# Patient Record
Sex: MALE | Race: OTHER | HISPANIC OR LATINO
[De-identification: names, ages, dates, MRNs, and addresses within clinical notes are randomized per-mention and may not be internally consistent; named-entity substitution may affect disease eponyms.]

---

## 2017-01-27 ENCOUNTER — APPOINTMENT (OUTPATIENT)
Dept: HEART AND VASCULAR | Facility: CLINIC | Age: 47
End: 2017-01-27

## 2017-03-10 ENCOUNTER — APPOINTMENT (OUTPATIENT)
Dept: HEART AND VASCULAR | Facility: CLINIC | Age: 47
End: 2017-03-10

## 2017-03-10 VITALS
SYSTOLIC BLOOD PRESSURE: 115 MMHG | DIASTOLIC BLOOD PRESSURE: 72 MMHG | BODY MASS INDEX: 31.34 KG/M2 | HEART RATE: 80 BPM | WEIGHT: 195 LBS | HEIGHT: 66 IN | OXYGEN SATURATION: 98 %

## 2017-03-13 LAB
ANION GAP SERPL CALC-SCNC: 13 MMOL/L
BUN SERPL-MCNC: 16 MG/DL
CALCIUM SERPL-MCNC: 10 MG/DL
CHLORIDE SERPL-SCNC: 101 MMOL/L
CO2 SERPL-SCNC: 25 MMOL/L
CREAT SERPL-MCNC: 0.97 MG/DL
GLUCOSE SERPL-MCNC: 83 MG/DL
MAGNESIUM SERPL-MCNC: 1.8 MG/DL
NT-PROBNP SERPL-MCNC: 694 PG/ML
POTASSIUM SERPL-SCNC: 4.8 MMOL/L
SODIUM SERPL-SCNC: 139 MMOL/L

## 2017-03-24 ENCOUNTER — APPOINTMENT (OUTPATIENT)
Dept: HEART AND VASCULAR | Facility: CLINIC | Age: 47
End: 2017-03-24

## 2017-03-24 VITALS — HEIGHT: 66 IN | BODY MASS INDEX: 30.53 KG/M2 | WEIGHT: 190 LBS

## 2017-03-24 VITALS — DIASTOLIC BLOOD PRESSURE: 63 MMHG | SYSTOLIC BLOOD PRESSURE: 105 MMHG | HEART RATE: 65 BPM

## 2017-03-24 DIAGNOSIS — Z00.00 ENCOUNTER FOR GENERAL ADULT MEDICAL EXAMINATION W/OUT ABNORMAL FINDINGS: ICD-10-CM

## 2017-04-07 ENCOUNTER — APPOINTMENT (OUTPATIENT)
Dept: HEART AND VASCULAR | Facility: CLINIC | Age: 47
End: 2017-04-07

## 2017-04-07 ENCOUNTER — OTHER (OUTPATIENT)
Age: 47
End: 2017-04-07

## 2017-04-07 VITALS
WEIGHT: 190 LBS | OXYGEN SATURATION: 98 % | HEIGHT: 66 IN | BODY MASS INDEX: 30.53 KG/M2 | DIASTOLIC BLOOD PRESSURE: 66 MMHG | HEART RATE: 72 BPM | SYSTOLIC BLOOD PRESSURE: 115 MMHG

## 2017-04-10 LAB
ANION GAP SERPL CALC-SCNC: 12 MMOL/L
BUN SERPL-MCNC: 17 MG/DL
CALCIUM SERPL-MCNC: 9.8 MG/DL
CHLORIDE SERPL-SCNC: 107 MMOL/L
CO2 SERPL-SCNC: 27 MMOL/L
CREAT SERPL-MCNC: 1.08 MG/DL
GLUCOSE SERPL-MCNC: 88 MG/DL
POTASSIUM SERPL-SCNC: 4.2 MMOL/L
SODIUM SERPL-SCNC: 146 MMOL/L

## 2017-04-19 ENCOUNTER — OTHER (OUTPATIENT)
Age: 47
End: 2017-04-19

## 2017-04-21 LAB
ANION GAP SERPL CALC-SCNC: 18 MMOL/L
BUN SERPL-MCNC: 14 MG/DL
CALCIUM SERPL-MCNC: 9.4 MG/DL
CHLORIDE SERPL-SCNC: 102 MMOL/L
CO2 SERPL-SCNC: 20 MMOL/L
CREAT SERPL-MCNC: 0.93 MG/DL
GLUCOSE SERPL-MCNC: 77 MG/DL
POTASSIUM SERPL-SCNC: 4.1 MMOL/L
SODIUM SERPL-SCNC: 140 MMOL/L

## 2017-04-25 ENCOUNTER — RX RENEWAL (OUTPATIENT)
Age: 47
End: 2017-04-25

## 2017-04-25 ENCOUNTER — OTHER (OUTPATIENT)
Age: 47
End: 2017-04-25

## 2017-05-12 ENCOUNTER — APPOINTMENT (OUTPATIENT)
Dept: HEART AND VASCULAR | Facility: CLINIC | Age: 47
End: 2017-05-12

## 2017-05-12 ENCOUNTER — OTHER (OUTPATIENT)
Age: 47
End: 2017-05-12

## 2017-05-12 VITALS
BODY MASS INDEX: 32.28 KG/M2 | HEART RATE: 68 BPM | SYSTOLIC BLOOD PRESSURE: 120 MMHG | WEIGHT: 200 LBS | DIASTOLIC BLOOD PRESSURE: 68 MMHG

## 2017-05-15 LAB
ANION GAP SERPL CALC-SCNC: 14 MMOL/L
BUN SERPL-MCNC: 15 MG/DL
CALCIUM SERPL-MCNC: 9.6 MG/DL
CHLORIDE SERPL-SCNC: 104 MMOL/L
CO2 SERPL-SCNC: 23 MMOL/L
CREAT SERPL-MCNC: 0.89 MG/DL
GLUCOSE SERPL-MCNC: 95 MG/DL
POTASSIUM SERPL-SCNC: 4.4 MMOL/L
SODIUM SERPL-SCNC: 141 MMOL/L

## 2017-05-17 ENCOUNTER — OTHER (OUTPATIENT)
Age: 47
End: 2017-05-17

## 2017-06-11 ENCOUNTER — FORM ENCOUNTER (OUTPATIENT)
Age: 47
End: 2017-06-11

## 2017-06-12 ENCOUNTER — OUTPATIENT (OUTPATIENT)
Dept: OUTPATIENT SERVICES | Facility: HOSPITAL | Age: 47
LOS: 1 days | End: 2017-06-12
Payer: MEDICAID

## 2017-06-12 ENCOUNTER — APPOINTMENT (OUTPATIENT)
Dept: HEART AND VASCULAR | Facility: CLINIC | Age: 47
End: 2017-06-12

## 2017-06-12 DIAGNOSIS — I50.22 CHRONIC SYSTOLIC (CONGESTIVE) HEART FAILURE: ICD-10-CM

## 2017-06-12 PROCEDURE — 93306 TTE W/DOPPLER COMPLETE: CPT | Mod: 26

## 2017-06-12 PROCEDURE — C8929: CPT

## 2017-06-16 ENCOUNTER — APPOINTMENT (OUTPATIENT)
Dept: HEART AND VASCULAR | Facility: CLINIC | Age: 47
End: 2017-06-16

## 2017-06-16 VITALS
BODY MASS INDEX: 32.14 KG/M2 | HEART RATE: 64 BPM | DIASTOLIC BLOOD PRESSURE: 60 MMHG | SYSTOLIC BLOOD PRESSURE: 103 MMHG | WEIGHT: 200 LBS | HEIGHT: 66 IN

## 2017-06-16 DIAGNOSIS — Z87.898 PERSONAL HISTORY OF OTHER SPECIFIED CONDITIONS: ICD-10-CM

## 2017-06-19 LAB
ANION GAP SERPL CALC-SCNC: 16 MMOL/L
BUN SERPL-MCNC: 16 MG/DL
CALCIUM SERPL-MCNC: 9.9 MG/DL
CHLORIDE SERPL-SCNC: 101 MMOL/L
CO2 SERPL-SCNC: 23 MMOL/L
CREAT SERPL-MCNC: 1.07 MG/DL
GLUCOSE SERPL-MCNC: 101 MG/DL
MAGNESIUM SERPL-MCNC: 1.8 MG/DL
NT-PROBNP SERPL-MCNC: 548 PG/ML
POTASSIUM SERPL-SCNC: 4.2 MMOL/L
SODIUM SERPL-SCNC: 140 MMOL/L

## 2017-06-28 ENCOUNTER — APPOINTMENT (OUTPATIENT)
Dept: HEART AND VASCULAR | Facility: CLINIC | Age: 47
End: 2017-06-28

## 2017-06-28 VITALS — HEART RATE: 84 BPM | DIASTOLIC BLOOD PRESSURE: 72 MMHG | SYSTOLIC BLOOD PRESSURE: 123 MMHG

## 2017-09-08 ENCOUNTER — APPOINTMENT (OUTPATIENT)
Dept: HEART AND VASCULAR | Facility: CLINIC | Age: 47
End: 2017-09-08

## 2018-01-02 ENCOUNTER — RESULT CHARGE (OUTPATIENT)
Age: 48
End: 2018-01-02

## 2018-01-03 ENCOUNTER — APPOINTMENT (OUTPATIENT)
Dept: HEART AND VASCULAR | Facility: CLINIC | Age: 48
End: 2018-01-03
Payer: MEDICAID

## 2018-01-03 VITALS
HEIGHT: 70 IN | SYSTOLIC BLOOD PRESSURE: 92 MMHG | BODY MASS INDEX: 28.06 KG/M2 | HEART RATE: 66 BPM | DIASTOLIC BLOOD PRESSURE: 58 MMHG | TEMPERATURE: 98.3 F | WEIGHT: 195.99 LBS | OXYGEN SATURATION: 98 %

## 2018-01-03 PROCEDURE — 93000 ELECTROCARDIOGRAM COMPLETE: CPT

## 2018-01-03 PROCEDURE — 99214 OFFICE O/P EST MOD 30 MIN: CPT | Mod: 25

## 2018-01-03 PROCEDURE — 36415 COLL VENOUS BLD VENIPUNCTURE: CPT

## 2018-01-03 RX ORDER — SPIRONOLACTONE 25 MG/1
25 TABLET ORAL
Qty: 30 | Refills: 2 | Status: DISCONTINUED | COMMUNITY
Start: 2017-04-25 | End: 2018-01-03

## 2018-01-04 ENCOUNTER — OTHER (OUTPATIENT)
Age: 48
End: 2018-01-04

## 2018-01-04 LAB
ALBUMIN SERPL ELPH-MCNC: 4.2 G/DL
ALP BLD-CCNC: 57 U/L
ALT SERPL-CCNC: 13 U/L
ANION GAP SERPL CALC-SCNC: 13 MMOL/L
AST SERPL-CCNC: 16 U/L
BILIRUB SERPL-MCNC: 0.2 MG/DL
BUN SERPL-MCNC: 17 MG/DL
CALCIUM SERPL-MCNC: 9.7 MG/DL
CHLORIDE SERPL-SCNC: 106 MMOL/L
CO2 SERPL-SCNC: 28 MMOL/L
CREAT SERPL-MCNC: 0.88 MG/DL
GLUCOSE SERPL-MCNC: 73 MG/DL
NT-PROBNP SERPL-MCNC: 433 PG/ML
POTASSIUM SERPL-SCNC: 5.2 MMOL/L
PROT SERPL-MCNC: 7.4 G/DL
SODIUM SERPL-SCNC: 147 MMOL/L

## 2018-12-13 ENCOUNTER — INPATIENT (INPATIENT)
Facility: HOSPITAL | Age: 48
LOS: 2 days | Discharge: HOME | DRG: 293 | End: 2018-12-16
Attending: INTERNAL MEDICINE | Admitting: INTERNAL MEDICINE
Payer: MEDICAID

## 2018-12-13 VITALS
DIASTOLIC BLOOD PRESSURE: 79 MMHG | TEMPERATURE: 98 F | OXYGEN SATURATION: 98 % | SYSTOLIC BLOOD PRESSURE: 108 MMHG | RESPIRATION RATE: 20 BRPM | HEART RATE: 117 BPM

## 2018-12-13 DIAGNOSIS — I50.9 HEART FAILURE, UNSPECIFIED: ICD-10-CM

## 2018-12-13 LAB
ALBUMIN SERPL ELPH-MCNC: 3.3 G/DL — LOW (ref 3.5–5)
ALP SERPL-CCNC: 51 U/L — SIGNIFICANT CHANGE UP (ref 40–120)
ALT FLD-CCNC: 28 U/L DA — SIGNIFICANT CHANGE UP (ref 10–60)
ANION GAP SERPL CALC-SCNC: 8 MMOL/L — SIGNIFICANT CHANGE UP (ref 5–17)
AST SERPL-CCNC: 25 U/L — SIGNIFICANT CHANGE UP (ref 10–40)
BASOPHILS # BLD AUTO: 0.1 K/UL — SIGNIFICANT CHANGE UP (ref 0–0.2)
BASOPHILS NFR BLD AUTO: 1 % — SIGNIFICANT CHANGE UP (ref 0–2)
BILIRUB SERPL-MCNC: 0.6 MG/DL — SIGNIFICANT CHANGE UP (ref 0.2–1.2)
BUN SERPL-MCNC: 12 MG/DL — SIGNIFICANT CHANGE UP (ref 7–18)
CALCIUM SERPL-MCNC: 8.9 MG/DL — SIGNIFICANT CHANGE UP (ref 8.4–10.5)
CHLORIDE SERPL-SCNC: 108 MMOL/L — SIGNIFICANT CHANGE UP (ref 96–108)
CO2 SERPL-SCNC: 26 MMOL/L — SIGNIFICANT CHANGE UP (ref 22–31)
CREAT SERPL-MCNC: 0.92 MG/DL — SIGNIFICANT CHANGE UP (ref 0.5–1.3)
EOSINOPHIL # BLD AUTO: 0.2 K/UL — SIGNIFICANT CHANGE UP (ref 0–0.5)
EOSINOPHIL NFR BLD AUTO: 1.5 % — SIGNIFICANT CHANGE UP (ref 0–6)
GLUCOSE SERPL-MCNC: 95 MG/DL — SIGNIFICANT CHANGE UP (ref 70–99)
HCT VFR BLD CALC: 44.6 % — SIGNIFICANT CHANGE UP (ref 39–50)
HGB BLD-MCNC: 14.2 G/DL — SIGNIFICANT CHANGE UP (ref 13–17)
LYMPHOCYTES # BLD AUTO: 2.7 K/UL — SIGNIFICANT CHANGE UP (ref 1–3.3)
LYMPHOCYTES # BLD AUTO: 27.6 % — SIGNIFICANT CHANGE UP (ref 13–44)
MCHC RBC-ENTMCNC: 31.1 PG — SIGNIFICANT CHANGE UP (ref 27–34)
MCHC RBC-ENTMCNC: 31.8 GM/DL — LOW (ref 32–36)
MCV RBC AUTO: 97.7 FL — SIGNIFICANT CHANGE UP (ref 80–100)
MONOCYTES # BLD AUTO: 0.6 K/UL — SIGNIFICANT CHANGE UP (ref 0–0.9)
MONOCYTES NFR BLD AUTO: 6 % — SIGNIFICANT CHANGE UP (ref 2–14)
NEUTROPHILS # BLD AUTO: 6.4 K/UL — SIGNIFICANT CHANGE UP (ref 1.8–7.4)
NEUTROPHILS NFR BLD AUTO: 63.9 % — SIGNIFICANT CHANGE UP (ref 43–77)
NT-PROBNP SERPL-SCNC: 2505 PG/ML — HIGH (ref 0–125)
PLATELET # BLD AUTO: 294 K/UL — SIGNIFICANT CHANGE UP (ref 150–400)
POTASSIUM SERPL-MCNC: 4.2 MMOL/L — SIGNIFICANT CHANGE UP (ref 3.5–5.3)
POTASSIUM SERPL-SCNC: 4.2 MMOL/L — SIGNIFICANT CHANGE UP (ref 3.5–5.3)
PROT SERPL-MCNC: 7.6 G/DL — SIGNIFICANT CHANGE UP (ref 6–8.3)
RBC # BLD: 4.57 M/UL — SIGNIFICANT CHANGE UP (ref 4.2–5.8)
RBC # FLD: 13.4 % — SIGNIFICANT CHANGE UP (ref 10.3–14.5)
SODIUM SERPL-SCNC: 142 MMOL/L — SIGNIFICANT CHANGE UP (ref 135–145)
TROPONIN I SERPL-MCNC: 0.07 NG/ML — HIGH (ref 0–0.04)
WBC # BLD: 10 K/UL — SIGNIFICANT CHANGE UP (ref 3.8–10.5)
WBC # FLD AUTO: 10 K/UL — SIGNIFICANT CHANGE UP (ref 3.8–10.5)

## 2018-12-13 PROCEDURE — 99285 EMERGENCY DEPT VISIT HI MDM: CPT

## 2018-12-13 PROCEDURE — 71045 X-RAY EXAM CHEST 1 VIEW: CPT | Mod: 26

## 2018-12-13 PROCEDURE — 93010 ELECTROCARDIOGRAM REPORT: CPT

## 2018-12-13 RX ORDER — FUROSEMIDE 40 MG
40 TABLET ORAL ONCE
Qty: 0 | Refills: 0 | Status: COMPLETED | OUTPATIENT
Start: 2018-12-13 | End: 2018-12-13

## 2018-12-13 RX ORDER — ASPIRIN/CALCIUM CARB/MAGNESIUM 324 MG
325 TABLET ORAL ONCE
Qty: 0 | Refills: 0 | Status: COMPLETED | OUTPATIENT
Start: 2018-12-13 | End: 2018-12-13

## 2018-12-13 RX ORDER — ENOXAPARIN SODIUM 100 MG/ML
40 INJECTION SUBCUTANEOUS DAILY
Qty: 0 | Refills: 0 | Status: DISCONTINUED | OUTPATIENT
Start: 2018-12-13 | End: 2018-12-16

## 2018-12-13 RX ORDER — PANTOPRAZOLE SODIUM 20 MG/1
40 TABLET, DELAYED RELEASE ORAL
Qty: 0 | Refills: 0 | Status: DISCONTINUED | OUTPATIENT
Start: 2018-12-13 | End: 2018-12-16

## 2018-12-13 RX ADMIN — Medication 325 MILLIGRAM(S): at 20:36

## 2018-12-13 RX ADMIN — Medication 40 MILLIGRAM(S): at 20:36

## 2018-12-13 NOTE — ED ADULT NURSE NOTE - NSIMPLEMENTINTERV_GEN_ALL_ED
Implemented All Universal Safety Interventions:  [place] to call system. Call bell, personal items and telephone within reach. Instruct patient to call for assistance. Room bathroom lighting operational. Non-slip footwear when patient is off stretcher. Physically safe environment: no spills, clutter or unnecessary equipment. Stretcher in lowest position, wheels locked, appropriate side rails in place.

## 2018-12-13 NOTE — ED PROVIDER NOTE - CARE PLAN
Principal Discharge DX:	Acute on chronic congestive heart failure, unspecified heart failure type  Secondary Diagnosis:	Elevated troponin

## 2018-12-13 NOTE — ED PROVIDER NOTE - PROGRESS NOTE DETAILS
Troponin and bmp came back elevated. CXR still pending. Will treat with asa and Lasix for suspected CHF exacerbation. Will admit. Informed Dr. Kendrick (covering) for admission.  Will inform MAR.

## 2018-12-13 NOTE — ED PROVIDER NOTE - OBJECTIVE STATEMENT
47 y/o M pt with a PMHx of hypercholesterolemia, HTN, renal stone, presents to the ED with complaints of 2 weeks worsening chest pain and shortness of breath associated with cough conductive of pink frothy sputum. Patient reports BLEVINS and orthopnea. Patient states he no longer takes Lasix for along time contrary to chart documentation. Patient notes no drug use or alcohol use. Patient denies leg swelling, fever, chills ot any other complaints. NKDA.

## 2018-12-14 DIAGNOSIS — I50.9 HEART FAILURE, UNSPECIFIED: ICD-10-CM

## 2018-12-14 DIAGNOSIS — E78.00 PURE HYPERCHOLESTEROLEMIA, UNSPECIFIED: ICD-10-CM

## 2018-12-14 DIAGNOSIS — Z29.9 ENCOUNTER FOR PROPHYLACTIC MEASURES, UNSPECIFIED: ICD-10-CM

## 2018-12-14 DIAGNOSIS — I10 ESSENTIAL (PRIMARY) HYPERTENSION: ICD-10-CM

## 2018-12-14 DIAGNOSIS — I24.9 ACUTE ISCHEMIC HEART DISEASE, UNSPECIFIED: ICD-10-CM

## 2018-12-14 LAB
ANION GAP SERPL CALC-SCNC: 6 MMOL/L — SIGNIFICANT CHANGE UP (ref 5–17)
BUN SERPL-MCNC: 12 MG/DL — SIGNIFICANT CHANGE UP (ref 7–18)
CALCIUM SERPL-MCNC: 8.8 MG/DL — SIGNIFICANT CHANGE UP (ref 8.4–10.5)
CHLORIDE SERPL-SCNC: 109 MMOL/L — HIGH (ref 96–108)
CHOLEST SERPL-MCNC: 146 MG/DL — SIGNIFICANT CHANGE UP (ref 10–199)
CK MB BLD-MCNC: 1.1 % — SIGNIFICANT CHANGE UP (ref 0–3.5)
CK MB BLD-MCNC: 1.2 % — SIGNIFICANT CHANGE UP (ref 0–3.5)
CK MB CFR SERPL CALC: 1.9 NG/ML — SIGNIFICANT CHANGE UP (ref 0–3.6)
CK MB CFR SERPL CALC: 2.3 NG/ML — SIGNIFICANT CHANGE UP (ref 0–3.6)
CK SERPL-CCNC: 156 U/L — SIGNIFICANT CHANGE UP (ref 35–232)
CK SERPL-CCNC: 202 U/L — SIGNIFICANT CHANGE UP (ref 35–232)
CO2 SERPL-SCNC: 26 MMOL/L — SIGNIFICANT CHANGE UP (ref 22–31)
CREAT SERPL-MCNC: 0.88 MG/DL — SIGNIFICANT CHANGE UP (ref 0.5–1.3)
GLUCOSE BLDC GLUCOMTR-MCNC: 120 MG/DL — HIGH (ref 70–99)
GLUCOSE SERPL-MCNC: 107 MG/DL — HIGH (ref 70–99)
HBA1C BLD-MCNC: 5.6 % — SIGNIFICANT CHANGE UP (ref 4–5.6)
HCT VFR BLD CALC: 42.7 % — SIGNIFICANT CHANGE UP (ref 39–50)
HDLC SERPL-MCNC: 65 MG/DL — SIGNIFICANT CHANGE UP
HGB BLD-MCNC: 13.6 G/DL — SIGNIFICANT CHANGE UP (ref 13–17)
LIPID PNL WITH DIRECT LDL SERPL: 58 MG/DL — SIGNIFICANT CHANGE UP
MAGNESIUM SERPL-MCNC: 2.3 MG/DL — SIGNIFICANT CHANGE UP (ref 1.6–2.6)
MCHC RBC-ENTMCNC: 31.2 PG — SIGNIFICANT CHANGE UP (ref 27–34)
MCHC RBC-ENTMCNC: 31.9 GM/DL — LOW (ref 32–36)
MCV RBC AUTO: 97.6 FL — SIGNIFICANT CHANGE UP (ref 80–100)
PHOSPHATE SERPL-MCNC: 3.7 MG/DL — SIGNIFICANT CHANGE UP (ref 2.5–4.5)
PLATELET # BLD AUTO: 291 K/UL — SIGNIFICANT CHANGE UP (ref 150–400)
POTASSIUM SERPL-MCNC: 3.8 MMOL/L — SIGNIFICANT CHANGE UP (ref 3.5–5.3)
POTASSIUM SERPL-SCNC: 3.8 MMOL/L — SIGNIFICANT CHANGE UP (ref 3.5–5.3)
RBC # BLD: 4.37 M/UL — SIGNIFICANT CHANGE UP (ref 4.2–5.8)
RBC # FLD: 13.7 % — SIGNIFICANT CHANGE UP (ref 10.3–14.5)
SODIUM SERPL-SCNC: 141 MMOL/L — SIGNIFICANT CHANGE UP (ref 135–145)
TOTAL CHOLESTEROL/HDL RATIO MEASUREMENT: 2.2 RATIO — LOW (ref 3.4–9.6)
TRIGL SERPL-MCNC: 116 MG/DL — SIGNIFICANT CHANGE UP (ref 10–149)
TROPONIN I SERPL-MCNC: 0.05 NG/ML — HIGH (ref 0–0.04)
TROPONIN I SERPL-MCNC: 0.06 NG/ML — HIGH (ref 0–0.04)
TSH SERPL-MCNC: 2.15 UU/ML — SIGNIFICANT CHANGE UP (ref 0.34–4.82)
VIT B12 SERPL-MCNC: 338 PG/ML — SIGNIFICANT CHANGE UP (ref 232–1245)
WBC # BLD: 9.4 K/UL — SIGNIFICANT CHANGE UP (ref 3.8–10.5)
WBC # FLD AUTO: 9.4 K/UL — SIGNIFICANT CHANGE UP (ref 3.8–10.5)

## 2018-12-14 PROCEDURE — 93306 TTE W/DOPPLER COMPLETE: CPT | Mod: 26

## 2018-12-14 RX ORDER — SPIRONOLACTONE 25 MG/1
25 TABLET, FILM COATED ORAL DAILY
Qty: 0 | Refills: 0 | Status: DISCONTINUED | OUTPATIENT
Start: 2018-12-14 | End: 2018-12-16

## 2018-12-14 RX ORDER — SACUBITRIL AND VALSARTAN 24; 26 MG/1; MG/1
1 TABLET, FILM COATED ORAL
Qty: 0 | Refills: 0 | Status: DISCONTINUED | OUTPATIENT
Start: 2018-12-14 | End: 2018-12-16

## 2018-12-14 RX ORDER — FUROSEMIDE 40 MG
20 TABLET ORAL ONCE
Qty: 0 | Refills: 0 | Status: COMPLETED | OUTPATIENT
Start: 2018-12-14 | End: 2018-12-14

## 2018-12-14 RX ORDER — FUROSEMIDE 40 MG
40 TABLET ORAL DAILY
Qty: 0 | Refills: 0 | Status: DISCONTINUED | OUTPATIENT
Start: 2018-12-14 | End: 2018-12-14

## 2018-12-14 RX ORDER — ASPIRIN/CALCIUM CARB/MAGNESIUM 324 MG
81 TABLET ORAL DAILY
Qty: 0 | Refills: 0 | Status: DISCONTINUED | OUTPATIENT
Start: 2018-12-14 | End: 2018-12-14

## 2018-12-14 RX ORDER — DIGOXIN 250 MCG
0.12 TABLET ORAL DAILY
Qty: 0 | Refills: 0 | Status: DISCONTINUED | OUTPATIENT
Start: 2018-12-14 | End: 2018-12-16

## 2018-12-14 RX ORDER — LISINOPRIL 2.5 MG/1
2.5 TABLET ORAL DAILY
Qty: 0 | Refills: 0 | Status: DISCONTINUED | OUTPATIENT
Start: 2018-12-14 | End: 2018-12-14

## 2018-12-14 RX ORDER — ASPIRIN/CALCIUM CARB/MAGNESIUM 324 MG
81 TABLET ORAL DAILY
Qty: 0 | Refills: 0 | Status: DISCONTINUED | OUTPATIENT
Start: 2018-12-14 | End: 2018-12-16

## 2018-12-14 RX ORDER — INFLUENZA VIRUS VACCINE 15; 15; 15; 15 UG/.5ML; UG/.5ML; UG/.5ML; UG/.5ML
0.5 SUSPENSION INTRAMUSCULAR ONCE
Qty: 0 | Refills: 0 | Status: DISCONTINUED | OUTPATIENT
Start: 2018-12-14 | End: 2018-12-16

## 2018-12-14 RX ORDER — FUROSEMIDE 40 MG
20 TABLET ORAL DAILY
Qty: 0 | Refills: 0 | Status: DISCONTINUED | OUTPATIENT
Start: 2018-12-14 | End: 2018-12-16

## 2018-12-14 RX ORDER — CARVEDILOL PHOSPHATE 80 MG/1
3.12 CAPSULE, EXTENDED RELEASE ORAL EVERY 12 HOURS
Qty: 0 | Refills: 0 | Status: DISCONTINUED | OUTPATIENT
Start: 2018-12-14 | End: 2018-12-14

## 2018-12-14 RX ORDER — ATORVASTATIN CALCIUM 80 MG/1
80 TABLET, FILM COATED ORAL AT BEDTIME
Qty: 0 | Refills: 0 | Status: DISCONTINUED | OUTPATIENT
Start: 2018-12-14 | End: 2018-12-16

## 2018-12-14 RX ORDER — CARVEDILOL PHOSPHATE 80 MG/1
3.12 CAPSULE, EXTENDED RELEASE ORAL EVERY 12 HOURS
Qty: 0 | Refills: 0 | Status: DISCONTINUED | OUTPATIENT
Start: 2018-12-14 | End: 2018-12-16

## 2018-12-14 RX ADMIN — Medication 20 MILLIGRAM(S): at 12:30

## 2018-12-14 RX ADMIN — Medication 81 MILLIGRAM(S): at 13:14

## 2018-12-14 RX ADMIN — CARVEDILOL PHOSPHATE 3.12 MILLIGRAM(S): 80 CAPSULE, EXTENDED RELEASE ORAL at 02:09

## 2018-12-14 RX ADMIN — ATORVASTATIN CALCIUM 80 MILLIGRAM(S): 80 TABLET, FILM COATED ORAL at 22:55

## 2018-12-14 NOTE — H&P ADULT - NSHPPHYSICALEXAM_GEN_ALL_CORE
Vital Signs Last 24 Hrs  T(C): 36.8 (14 Dec 2018 01:02), Max: 36.8 (14 Dec 2018 01:02)  T(F): 98.2 (14 Dec 2018 01:02), Max: 98.2 (14 Dec 2018 01:02)  HR: 105 (14 Dec 2018 01:02) (105 - 117)  BP: 110/75 (14 Dec 2018 01:02) (108/79 - 110/75)  RR: 18 (14 Dec 2018 01:02) (18 - 20)  SpO2: 97% (14 Dec 2018 01:02) (97% - 98%)

## 2018-12-14 NOTE — CONSULT NOTE ADULT - ASSESSMENT
48 yr old Male with PMHX of Non-ischemic CM w/ Severe global LVSD, Grade IV DD EF 15%, HTN, HLD presents with CP and SOB x 2 weeks,acute on chronic systolic HF due to non-compliance.  1.Tele monitoring.  2.Echocardiogram.  3.Change IV lasix 20mg qd, add aldactone 25mg qd.  4..D/C ACE,resume entresto.  5.CHF-agree with coreg, add dig .125mg qd.  6.GI and DVT prophylaxis.  7.D/W pt need for compliance with BP medication.

## 2018-12-14 NOTE — H&P ADULT - PROBLEM SELECTOR PLAN 3
-c/w current antihypertensive regimen  -Monitor BP  -Dash Diet -Hold antihypertensives with concern for hypotension- c/w lasix and coreg   -Monitor BP  -Dash Diet

## 2018-12-14 NOTE — H&P ADULT - RS GEN PE MLT RESP DETAILS PC
no rales/good air movement/airway patent/no rhonchi/no wheezes/breath sounds equal/respirations non-labored

## 2018-12-14 NOTE — H&P ADULT - PMH
CHF (congestive heart failure), NYHA class IV, chronic, combined    HTN (hypertension)    Hypercholesterolemia    Renal stones

## 2018-12-14 NOTE — H&P ADULT - HISTORY OF PRESENT ILLNESS
Pt is a 48M, Hx of Systolic CHF w/ Severe global LVSD, Grade IV DD EF 15%, HTN, HLD presents with CP and SOB x 2 weeks.  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  Pt states the pain is intermittent mainly during work and relieved with rest.  He states the cough has been getting progressively worse waqas at night when he lies flat.  Pt states he is not compliant with his medications, and chooses only to take entresto, and atorvastatin.  He sees his cardiologist, Dr. Louis, monthly.  He saw him last month, but did not have any new issues at that time.  Pt states his last echo was done over a year ago showing mild improvent of his EF.  Pt was recommended an AICD in the past, but refused bec he is "too young to have an implant."  Pt denies dizziness, syncope, HA, weakness, recent illness, recent travel, dizziness, N/V/D/C, abd pain, fever or chills.     In the ED, pt presents in no acute distress, vitals sig for  remaining WNL, and EKG sinus tach

## 2018-12-14 NOTE — H&P ADULT - PROBLEM SELECTOR PLAN 2
-Decompensated HF, likely from medication and dietary non-compliance  -History of Systolic CHF w/ EF of 15-20%, GIV DD w/ persistent tachycardia  -Pt non-compliant with diuretic and b-blocker   -will resume low dose Coreg, and lasix 40IV daily for now  -will continue switch Entresto to Losartan   -Tele monitoring   -Hold IV fluids and monitor daily weight/ strict I/O's  -Low Sodium Diet w/ fluid restriction   -f/u repeat Echo  -Cardio: Dr. Palafox

## 2018-12-14 NOTE — CONSULT NOTE ADULT - SUBJECTIVE AND OBJECTIVE BOX
CHIEF COMPLAINT:Patient is a 48y old  Male who presents with a chief complaint of CP .      HPI:  Pt is a 48 yr old Male with PMHX of Non-ischemic CM w/ Severe global LVSD, Grade IV DD EF 15%, HTN, HLD presents with CP and SOB x 2 weeks.  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  Pt states the pain is intermittent mainly during work and relieved with rest.  He states the cough has been getting progressively worse waqas at night when he lies flat.  Pt states he is not compliant with his medications, and chooses only to take entresto, and atorvastatin.  He sees his cardiologist, Dr. Louis, monthly.  He saw him last month, but did not have any new issues at that time.  Pt states his last echo was done over a year ago showing mild improvent of his EF.  Pt was recommended an AICD in the past, but refused bec he is "too young to have an implant."  Pt denies dizziness, syncope, HA, weakness, recent illness, recent travel, dizziness, N/V/D/C, abd pain, fever or chills.     In the ED, pt presents in no acute distress, vitals sig for  remaining WNL, and EKG sinus tach (14 Dec 2018 01:19)      PAST MEDICAL & SURGICAL HISTORY:  CHF (congestive heart failure), NYHA class IV, chronic, combined  Hypercholesterolemia  HTN (hypertension)  Renal stones        MEDICATIONS  (STANDING):  aspirin  chewable 81 milliGRAM(s) Oral daily  atorvastatin 80 milliGRAM(s) Oral at bedtime  carvedilol 3.125 milliGRAM(s) Oral every 12 hours  enoxaparin Injectable 40 milliGRAM(s) SubCutaneous daily  furosemide   Injectable 40 milliGRAM(s) IV Push daily  influenza   Vaccine 0.5 milliLiter(s) IntraMuscular once  lisinopril 2.5 milliGRAM(s) Oral daily  pantoprazole    Tablet 40 milliGRAM(s) Oral before breakfast    MEDICATIONS  (PRN):      FAMILY HISTORY:  Family history of cerebrovascular accident (CVA) (Father)      SOCIAL HISTORY:    [ x] Non-smoker    [x ] Alcohol-denies    Allergies    No Known Allergies    Intolerances    	    REVIEW OF SYSTEMS:  CONSTITUTIONAL: No fever, weight loss, or fatigue  EYES: No eye pain, visual disturbances, or discharge  ENT:  No difficulty hearing, tinnitus, vertigo; No sinus or throat pain  NECK: No pain or stiffness  RESPIRATORY: + cough, No wheezing, chills or hemoptysis; + Shortness of Breath  CARDIOVASCULAR: + chest pain, No palpitations, passing out, dizziness, or leg swelling  GASTROINTESTINAL: No abdominal or epigastric pain. No nausea, vomiting, or hematemesis; No diarrhea or constipation. No melena or hematochezia.  GENITOURINARY: No dysuria, frequency, hematuria, or incontinence  NEUROLOGICAL: No headaches, memory loss, loss of strength, numbness, or tremors  SKIN: No itching, burning, rashes, or lesions   LYMPH Nodes: No enlarged glands  ENDOCRINE: No heat or cold intolerance; No hair loss  MUSCULOSKELETAL: No joint pain or swelling; No muscle, back, or extremity pain  PSYCHIATRIC: No depression, anxiety, mood swings, or difficulty sleeping  HEME/LYMPH: No easy bruising, or bleeding gums  ALLERGY AND IMMUNOLOGIC: No hives or eczema	      PHYSICAL EXAM:  T(C): 36.8 (18 @ 11:02), Max: 36.9 (18 @ 02:50)  HR: 108 (18 @ 11:02) (100 - 117)  BP: 106/65 (18 @ 11:02) (98/66 - 110/75)  RR: 18 (18 @ 11:02) (18 - 20)  SpO2: 100% (18 @ 11:02) (97% - 100%)      Appearance: Normal	  HEENT:   Normal oral mucosa, PERRL, EOMI	  Lymphatic: No lymphadenopathy  Cardiovascular: Normal S1 S2, + JVD, No murmurs, No edema  Respiratory: Dec BS at bases  Psychiatry: A & O x 3, Mood & affect appropriate  Gastrointestinal:  Soft, Non-tender, + BS	  Skin: No rashes, No ecchymoses, No cyanosis	  Neurologic: Non-focal  Extremities: Normal range of motion, No clubbing, cyanosis or edema  Vascular: Peripheral pulses palpable 2+ bilaterally        ECG:  	sinus tach, lbbb    LABS:	 	    CARDIAC MARKERS:  CARDIAC MARKERS ( 14 Dec 2018 10:14 )  0.046 ng/mL / x     / 156 U/L / x     / 1.9 ng/mL  CARDIAC MARKERS ( 14 Dec 2018 00:11 )  0.056 ng/mL / x     / 202 U/L / x     / 2.3 ng/mL  CARDIAC MARKERS ( 13 Dec 2018 18:48 )  0.068 ng/mL / x     / x     / x     / x                                  13.6   9.4   )-----------( 291      ( 14 Dec 2018 05:57 )             42.7         141  |  109<H>  |  12  ----------------------------<  107<H>  3.8   |  26  |  0.88    Ca    8.8      14 Dec 2018 05:57  Phos  3.7       Mg     2.3         TPro  7.6  /  Alb  3.3<L>  /  TBili  0.6  /  DBili  x   /  AST  25  /  ALT  28  /  AlkPhos  51      proBNP: Serum Pro-Brain Natriuretic Peptide: 2505 pg/mL ( @ 18:48)    Lipid Profile: Cholesterol 146  LDL 58  HDL 65      HgA1c: Hemoglobin A1C, Whole Blood: 5.6 % ( @ 09:10)    TSH: Thyroid Stimulating Hormone, Serum: 2.15 uU/mL ( @ 05:57)        LM:   --  LM: Normal.  LAD:   --  LAD: Normal.  CX:   --  Circumflex: Normal.  RCA:   --  RCA: Normal.  COMPLICATIONS: There were no complications.  DIAGNOSTIC RECOMMENDATIONS: The patient should continue with the present  medications. The patient's diuretic regimen should be carefully increased.  Prepared and signed by  Georgi Croft M.D.  Signed 2016 17:52:23      EXAM:  ECHOCARDIOGRAM W CONTRAST                          PROCEDURE DATE:  2017                        INTERPRETATION:  Patient Height: 177.8 cm  Patient Weight: 90.7 kg  Systolic Pressure: 102 mmHg  Diastolic Pressure: 49 mmHg  BSA: 2.1 m^2  Interpretation Summary  The left ventricle is moderately dilated. There is severe global   hypokinesis of   the left ventricle. The left ventricular ejection fraction is severely   reduced. The left ventricular ejection fraction is estimated to be   15-20%  The   right ventricle is normal in size and function.The left atrium is mildly   dilated.The left atrial volume index is 35 cc/m2 (normal <34cc/m2)Right   atrial   size is normal.Structurally normal aortic valve.No aortic regurgitation   noted.Tethered mitral valve leaflets with normal opening.There is mild to   moderate mitral regurgitation.Structurally normal tricuspid valve.There   is   trace tricuspid regurgitation.The pulmonary artery systolic pressure is   estimated to be 20 mmHg.Structurally normal pulmonic valve.No pulmonic   regurgitation noted.There is no pericardial effusion.  Procedure Details  A complete two-dimensional transthoracic echocardiogram was performed (2D,  M-mode, spectral and color flow doppler).  Study Quality: Fair.  After verbal consent obtained, contrast injection of 2ml of diluted   Definity  contrast (1.3ml Definity diluted in 8.7ml Saline) were given to enhance  endocardial resolution.  Contrast injection with Definity was performed.  Left Ventricle  The left ventricle is moderately dilated.  There is severe global hypokinesis of the left ventricle.  The left ventricular ejection fraction is severely reduced.  The left ventricular ejection fraction is estimated to be 15-20%  Left Atrium  The left atrium is mildly dilated.  The left atrial volume index is 35 cc/m2 (normal <34cc/m2)  Right Atrium  Right atrial size is normal.  Right Ventricle  The right ventricle is normal in size and function.  Aortic Valve  Structurally normal aortic valve.  No aortic regurgitation noted.  Mitral Valve  Tethered mitral valve leaflets with normal opening.  There is mild to moderate mitral regurgitation.  Tricuspid Valve  Structurally normal tricuspid valve.  There is trace tricuspid regurgitation.  The pulmonary artery systolic pressure is estimated to be 20 mmHg.  Pulmonic Valve  Structurally normal pulmonic valve.  No pulmonic regurgitation noted.  Arteries and Venous System  No aortic root dilatation.  Normal size aortic arch with no hemodynamic evidence for coarctation  The inferior vena cava is normal in size (<2.1 cm) with normal inspiratory  collapse (>50%) consistent with normal right atrial pressure.  Pericardium / Pleura  There is no pericardial effusion.  Doppler Measurements & Calculations  MV E point: 79.0 cm/sec  MV A point: 40.0 cm/sec  MV E/A: 2.0  MV dec time: 0.2 sec  Ao V2 max: 109.9 cm/sec  Ao max P.8 mmHg  Ao max PG (full): 3.3 mmHg  LV max P.6 mmHg  LV V1 max: 62.7 cm/sec  MR max gonzalo: 468.7 cm/sec  MR max P.9mmHg  MR mean gonzalo: 336.3 cm/sec  MR mean P.5 mmHg  MR VTI: 176.4 cm  MR PISA: 1.9 cm^2  MR flow rate: 72.2 cm^3/sec  MR ERO: 0.2 cm^2  MR volume: 27.2 ml  MR PISA radius: 0.5 cm  MR alias gonzalo: 38.5 cm/sec  TR Max gonzalo: 215.8 cm/sec  MMode 2D Measurements & Calculations  IVSd: 0.9 cm  LVIDd: 6.4 cm  LVIDs: 5.5 cm  LVPWd: 1.0 cm  IVS/LVPW: 0.9  FS: 14.0 %  EDV(Teich): 211.5 ml  ESV(Teich): 150.0 ml  LV mass(C)d: 258.2 grams  LV mass(C)dI: 123.7 grams/m^2  SI(cubed): 46.5 ml/m^2  Ao root diam: 2.9 cm  Ao root area: 6.8 cm^2  LA dimension: 3.9 cm  LA/Ao: 1.3  LVLd ap4: 8.9 cm  EDV(MOD-sp4): 217 ml  LVLs ap4: 8.5 cm  ESV(MOD-sp4): 156 ml  EF(MOD-sp4): 28.1 %  SV(MOD-sp4): 61 ml  SI(MOD-sp4): 29.2 ml/m^2  Interpreting Physician:Krystin Burris MD electronically signed on 2017   17:31:30

## 2018-12-14 NOTE — H&P ADULT - ASSESSMENT
Pt is a 48M, Hx of Systolic CHF w/ Severe global LVSD, Grade IV DD EF 15%, HTN, HLD presents with CP and SOB x 2 weeks.  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  In the ED, pt presents in no acute distress, vitals sig for  remaining WNL, and EKG sinus tach.  Pt remains afebrile w/o leukocytosis.  Remaining labs concerning for trop 0.063 w/ proBNP of 2505.  Symptoms are likely 2/2 decompensated CHF exacerbation given pts medication non-compliance, but ACS should be ruled out.      Pt will be admitted to tele for SCA to r/o NSTEMI, and CHF exacerbation Pt is a 48M, Hx of Systolic CHF w/ Severe global LVSD, Grade IV DD EF 15%, HTN, HLD presents with CP and SOB x 2 weeks.  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  In the ED, pt presents in no acute distress, vitals sig for  remaining WNL, and EKG sinus tach.  Pt remains afebrile w/o leukocytosis.  Remaining labs concerning for trop 0.063 w/ proBNP of 2505.  Symptoms are likely 2/2 decompensated CHF exacerbation given pts medication non-compliance, but ACS should be ruled out.      Pt will be admitted to tele for ACS to r/o NSTEMI, and CHF exacerbation

## 2018-12-15 ENCOUNTER — TRANSCRIPTION ENCOUNTER (OUTPATIENT)
Age: 48
End: 2018-12-15

## 2018-12-15 LAB
ANION GAP SERPL CALC-SCNC: 7 MMOL/L — SIGNIFICANT CHANGE UP (ref 5–17)
BUN SERPL-MCNC: 17 MG/DL — SIGNIFICANT CHANGE UP (ref 7–18)
CALCIUM SERPL-MCNC: 8.7 MG/DL — SIGNIFICANT CHANGE UP (ref 8.4–10.5)
CHLORIDE SERPL-SCNC: 105 MMOL/L — SIGNIFICANT CHANGE UP (ref 96–108)
CO2 SERPL-SCNC: 25 MMOL/L — SIGNIFICANT CHANGE UP (ref 22–31)
CREAT SERPL-MCNC: 1 MG/DL — SIGNIFICANT CHANGE UP (ref 0.5–1.3)
GLUCOSE SERPL-MCNC: 117 MG/DL — HIGH (ref 70–99)
HCT VFR BLD CALC: 44.9 % — SIGNIFICANT CHANGE UP (ref 39–50)
HGB BLD-MCNC: 14.3 G/DL — SIGNIFICANT CHANGE UP (ref 13–17)
MCHC RBC-ENTMCNC: 30.9 PG — SIGNIFICANT CHANGE UP (ref 27–34)
MCHC RBC-ENTMCNC: 31.7 GM/DL — LOW (ref 32–36)
MCV RBC AUTO: 97.4 FL — SIGNIFICANT CHANGE UP (ref 80–100)
PLATELET # BLD AUTO: 294 K/UL — SIGNIFICANT CHANGE UP (ref 150–400)
POTASSIUM SERPL-MCNC: 4.1 MMOL/L — SIGNIFICANT CHANGE UP (ref 3.5–5.3)
POTASSIUM SERPL-SCNC: 4.1 MMOL/L — SIGNIFICANT CHANGE UP (ref 3.5–5.3)
RBC # BLD: 4.62 M/UL — SIGNIFICANT CHANGE UP (ref 4.2–5.8)
RBC # FLD: 13.2 % — SIGNIFICANT CHANGE UP (ref 10.3–14.5)
SODIUM SERPL-SCNC: 137 MMOL/L — SIGNIFICANT CHANGE UP (ref 135–145)
WBC # BLD: 11.8 K/UL — HIGH (ref 3.8–10.5)
WBC # FLD AUTO: 11.8 K/UL — HIGH (ref 3.8–10.5)

## 2018-12-15 RX ORDER — SACUBITRIL AND VALSARTAN 24; 26 MG/1; MG/1
1 TABLET, FILM COATED ORAL
Qty: 60 | Refills: 0 | OUTPATIENT
Start: 2018-12-15 | End: 2019-01-13

## 2018-12-15 RX ORDER — CARVEDILOL PHOSPHATE 80 MG/1
1 CAPSULE, EXTENDED RELEASE ORAL
Qty: 60 | Refills: 0 | OUTPATIENT
Start: 2018-12-15 | End: 2019-01-13

## 2018-12-15 RX ORDER — SPIRONOLACTONE 25 MG/1
1 TABLET, FILM COATED ORAL
Qty: 0 | Refills: 0 | COMMUNITY
Start: 2018-12-15

## 2018-12-15 RX ORDER — DIGOXIN 250 MCG
1 TABLET ORAL
Qty: 30 | Refills: 0 | OUTPATIENT
Start: 2018-12-15 | End: 2019-01-13

## 2018-12-15 RX ADMIN — Medication 81 MILLIGRAM(S): at 13:26

## 2018-12-15 RX ADMIN — SPIRONOLACTONE 25 MILLIGRAM(S): 25 TABLET, FILM COATED ORAL at 05:32

## 2018-12-15 RX ADMIN — ENOXAPARIN SODIUM 40 MILLIGRAM(S): 100 INJECTION SUBCUTANEOUS at 13:26

## 2018-12-15 RX ADMIN — ATORVASTATIN CALCIUM 80 MILLIGRAM(S): 80 TABLET, FILM COATED ORAL at 22:35

## 2018-12-15 RX ADMIN — PANTOPRAZOLE SODIUM 40 MILLIGRAM(S): 20 TABLET, DELAYED RELEASE ORAL at 05:33

## 2018-12-15 RX ADMIN — SACUBITRIL AND VALSARTAN 1 TABLET(S): 24; 26 TABLET, FILM COATED ORAL at 05:30

## 2018-12-15 RX ADMIN — Medication 0.12 MILLIGRAM(S): at 05:30

## 2018-12-15 RX ADMIN — CARVEDILOL PHOSPHATE 3.12 MILLIGRAM(S): 80 CAPSULE, EXTENDED RELEASE ORAL at 05:32

## 2018-12-15 RX ADMIN — Medication 20 MILLIGRAM(S): at 05:30

## 2018-12-15 NOTE — DISCHARGE NOTE ADULT - HOSPITAL COURSE
Pt is a 48M, Hx of Systolic CHF w/ Severe global LVSD, Grade IV DD EF 15%, HTN, HLD presents with CP and SOB x 2 weeks.  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  Pt states the pain is intermittent mainly during work and relieved with rest.  He states the cough has been getting progressively worse waqas at night when he lies flat.  Pt states he is not compliant with his medications, and chooses only to take entresto, and atorvastatin.  He sees his cardiologist, Dr. Louis, monthly.  He saw him last month, but did not have any new issues at that time.  Pt states his last echo was done over a year ago showing mild improvent of his EF.  Pt was recommended an AICD in the past, but refused bec he is "too young to have an implant."  Pt denies dizziness, syncope, HA, weakness, recent illness, recent travel, dizziness, N/V/D/C, abd pain, fever or chills. In the ED, pt presents in no acute distress, vitals sig for  remaining WNL, and EKG sinus tach . Given patient's improved clinical status and current hemodynamic stability, decision was made to discharge the patient. Patient is stable for discharge per attending and is advised to follow up with PCP as outpatient . Please refer to patient's complete medical chart with documents for a full hospital course, for this is only a brief summary.

## 2018-12-15 NOTE — DISCHARGE NOTE ADULT - PROVIDER TOKENS
FREE:[LAST:[Rolando],FIRST:[Mishel],PHONE:[phone]],FAX:[(   [phone]    -]],TOKEN:'[id number]'

## 2018-12-15 NOTE — DISCHARGE NOTE ADULT - PLAN OF CARE
Blood pressure <130/80 mm Hg Your medications were adjusted in the hospital. You are on medications to manage both CHF and HTN. Continue with medications as prescribed. Your lipid profile is stable. Continue with Atorvastatin home dose Resolution of symptoms, improved quality of life You were admitted for evaluation of your chest pain and difficulty breathing. Your heart enzymes were trended, which did not show possibility of an ischemia. You were seen by cardiologist Dr Palafox who adjusted your medications for management of both congestive heart failure and hypertension. An ultrasound of your heart was done which shows Ejection fraction (pumping action) of 10%). Continue with medications as prescribed. You were counseled regarding the need to be compliant with your medications to prevent worsening of your condition.

## 2018-12-15 NOTE — DISCHARGE NOTE ADULT - CARE PLAN
Principal Discharge DX:	Acute on chronic congestive heart failure, unspecified heart failure type  Goal:	Resolution of symptoms, improved quality of life  Assessment and plan of treatment:	You were admitted for evaluation of your chest pain and difficulty breathing. Your heart enzymes were trended, which did not show possibility of an ischemia. You were seen by cardiologist Dr Palafox who adjusted your medications for management of both congestive heart failure and hypertension. An ultrasound of your heart was done which shows Ejection fraction (pumping action) of 10%). Continue with medications as prescribed. You were counseled regarding the need to be compliant with your medications to prevent worsening of your condition.  Secondary Diagnosis:	Essential hypertension  Goal:	Blood pressure <130/80 mm Hg  Assessment and plan of treatment:	Your medications were adjusted in the hospital. You are on medications to manage both CHF and HTN. Continue with medications as prescribed.  Secondary Diagnosis:	Hypercholesterolemia  Assessment and plan of treatment:	Your lipid profile is stable. Continue with Atorvastatin home dose

## 2018-12-15 NOTE — DISCHARGE NOTE ADULT - CARE PROVIDER_API CALL
Mishel Marshall  Phone: [phone]  Fax: [phone]   )    -    Aminah Palafox), Internal Medicine  [address] [place] [id number]  Phone: [phone]  Fax: [phone]

## 2018-12-15 NOTE — DISCHARGE NOTE ADULT - MEDICATION SUMMARY - MEDICATIONS TO TAKE
I will START or STAY ON the medications listed below when I get home from the hospital:    Aldactone 25 mg oral tablet  -- 1 tab(s) by mouth once a day  -- Indication: For CHF (congestive heart failure), NYHA class IV, chronic, combined    aspirin 81 mg oral tablet, chewable  -- 1 tab(s) by mouth once a day    hospital  -- Indication: For CHF (congestive heart failure), NYHA class IV, chronic, combined    sacubitril-valsartan 24 mg-26 mg oral tablet  -- 1 tab(s) by mouth 2 times a day  -- Indication: For CHF (congestive heart failure), NYHA class IV, chronic, combined    digoxin 125 mcg (0.125 mg) oral tablet  -- 1 tab(s) by mouth once a day  -- Indication: For CHF (congestive heart failure), NYHA class IV, chronic, combined    atorvastatin 40 mg oral tablet  -- 1 tab(s) by mouth once a day (at bedtime)  -- Avoid grapefruit and grapefruit juice while taking this medication.  Do not take this drug if you are pregnant.  It is very important that you take or use this exactly as directed.  Do not skip doses or discontinue unless directed by your doctor.  Obtain medical advice before taking any non-prescription drugs as some may affect the action of this medication.  Take with food or milk.    -- Indication: For Hypercholesterolemia    carvedilol 3.125 mg oral tablet  -- 1 tab(s) by mouth every 12 hours  -- Indication: For HTN (hypertension)    ProAir HFA CFC free 90 mcg/inh inhalation aerosol  -- 2 puff(s) inhaled 4 times a day    hospital  -- For inhalation only.  It is very important that you take or use this exactly as directed.  Do not skip doses or discontinue unless directed by your doctor.  Obtain medical advice before taking any non-prescription drugs as some may affect the action of this medication.  Shake well before use.    -- Indication: For Wheezing    albuterol-ipratropium 2.5 mg-0.5 mg/3 mL inhalation solution  -- 3 milliliter(s) inhaled every 6 hours, As needed, Shortness of Breath and/or Wheezing    Hospital  -- Indication: For Wheezing    furosemide 20 mg oral tablet  -- 1 tab(s) by mouth once a day  -- Avoid prolonged or excessive exposure to direct and/or artificial sunlight while taking this medication.  It is very important that you take or use this exactly as directed.  Do not skip doses or discontinue unless directed by your doctor.  It may be advisable to drink a full glass orange juice or eat a banana daily while taking this medication.    -- Indication: For CHF (congestive heart failure), NYHA class IV, chronic, combined

## 2018-12-15 NOTE — DISCHARGE NOTE ADULT - OTHER SIGNIFICANT FINDINGS
Pt is a 48M, Hx of Systolic CHF w/ Severe global LVSD, Grade IV DD EF 15%, HTN, HLD presents with CP and SOB x 2 weeks.  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  Pt states the pain is intermittent mainly during work and relieved with rest.  He states the cough has been getting progressively worse waqas at night when he lies flat.  Pt states he is not compliant with his medications, and chooses only to take entresto, and atorvastatin.  He sees his cardiologist, Dr. Louis, monthly.  He saw him last month, but did not have any new issues at that time.  Pt states his last echo was done over a year ago showing mild improvent of his EF.  Pt was recommended an AICD in the past, but refused bec he is "too young to have an implant."  Pt denies dizziness, syncope, HA, weakness, recent illness, recent travel, dizziness, N/V/D/C, abd pain, fever or chills.     In the ED, pt presents in no acute distress, vitals sig for  remaining WNL, and EKG sinus tach   In patient: trops trended x3, were lower on third test. Echo done revealed EF of 10%, grossly reduced LVSF, moderate MR, RV systolic pressure is moderately increased at  52 mm  Hg. Patient was seen by cardio Dr Palafox, ace inhibitor was stopped, patient started on coreg, digoxin and aldactone in addition to entestro. He was also on iv lasix 20mg daily, switched to po. Patient was counseled regarding need to be compliant with his medications. Patient currently stable and requesting to go home, clinically stable for discharge as per attending.    Note that this is a brief summary of the hospital course. Please refer to charts for complete information.

## 2018-12-15 NOTE — DISCHARGE NOTE ADULT - MEDICATION SUMMARY - MEDICATIONS TO STOP TAKING
I will STOP taking the medications listed below when I get home from the hospital:    Tessalon Perles 100 mg oral capsule  -- 1 cap(s) by mouth 3 times a day    home    Mucinex 600 mg oral tablet, extended release  -- 1 tab(s) by mouth every 12 hours  home    enalapril 2.5 mg oral tablet  -- 1 tab(s) by mouth 2 times a day    Toprol-XL 25 mg oral tablet, extended release  -- 0.5  by mouth once a day    metoprolol 25 mg oral tablet  -- 0.5 tab(s) by mouth once a day  -- It is very important that you take or use this exactly as directed.  Do not skip doses or discontinue unless directed by your doctor.  May cause drowsiness.  Alcohol may intensify this effect.  Use care when operating dangerous machinery.  Some non-prescription drugs may aggravate your condition.  Read all labels carefully.  If a warning appears, check with your doctor before taking.  Take with food or milk.  This drug may impair the ability to drive or operate machinery.  Use care until you become familiar with its effects.    enalapril 2.5 mg oral tablet  -- 1 tab(s) by mouth 2 times a day  -- Do not take this drug if you are pregnant.  It is very important that you take or use this exactly as directed.  Do not skip doses or discontinue unless directed by your doctor.  Some non-prescription drugs may aggravate your condition.  Read all labels carefully.  If a warning appears, check with your doctor before taking.    atorvastatin 40 mg oral tablet  -- 1 tab(s) by mouth once a day (at bedtime)  -- Avoid grapefruit and grapefruit juice while taking this medication.  Do not take this drug if you are pregnant.  It is very important that you take or use this exactly as directed.  Do not skip doses or discontinue unless directed by your doctor.  Obtain medical advice before taking any non-prescription drugs as some may affect the action of this medication.  Take with food or milk.    enalapril 2.5 mg oral tablet  -- 1 tab(s) by mouth 2 times a day  -- Do not take this drug if you are pregnant.  It is very important that you take or use this exactly as directed.  Do not skip doses or discontinue unless directed by your doctor.  Some non-prescription drugs may aggravate your condition.  Read all labels carefully.  If a warning appears, check with your doctor before taking.    furosemide 20 mg oral tablet  -- 1 tab(s) by mouth once a day  -- Avoid prolonged or excessive exposure to direct and/or artificial sunlight while taking this medication.  It is very important that you take or use this exactly as directed.  Do not skip doses or discontinue unless directed by your doctor.  It may be advisable to drink a full glass orange juice or eat a banana daily while taking this medication.    metoprolol 25 mg oral tablet  -- 0.5 tab(s) by mouth once a day  -- It is very important that you take or use this exactly as directed.  Do not skip doses or discontinue unless directed by your doctor.  May cause drowsiness.  Alcohol may intensify this effect.  Use care when operating dangerous machinery.  Some non-prescription drugs may aggravate your condition.  Read all labels carefully.  If a warning appears, check with your doctor before taking.  Take with food or milk.  This drug may impair the ability to drive or operate machinery.  Use care until you become familiar with its effects.

## 2018-12-15 NOTE — DISCHARGE NOTE ADULT - PATIENT PORTAL LINK FT
You can access the [facility] Patient Portal, offered by [facility], by registering with the following website: [email or web address] [place]

## 2018-12-16 VITALS
HEART RATE: 105 BPM | OXYGEN SATURATION: 100 % | DIASTOLIC BLOOD PRESSURE: 51 MMHG | RESPIRATION RATE: 17 BRPM | SYSTOLIC BLOOD PRESSURE: 102 MMHG | TEMPERATURE: 98 F

## 2018-12-16 LAB
ANION GAP SERPL CALC-SCNC: 7 MMOL/L — SIGNIFICANT CHANGE UP (ref 5–17)
BUN SERPL-MCNC: 18 MG/DL — SIGNIFICANT CHANGE UP (ref 7–18)
CALCIUM SERPL-MCNC: 8.9 MG/DL — SIGNIFICANT CHANGE UP (ref 8.4–10.5)
CHLORIDE SERPL-SCNC: 106 MMOL/L — SIGNIFICANT CHANGE UP (ref 96–108)
CO2 SERPL-SCNC: 25 MMOL/L — SIGNIFICANT CHANGE UP (ref 22–31)
CREAT SERPL-MCNC: 0.89 MG/DL — SIGNIFICANT CHANGE UP (ref 0.5–1.3)
GLUCOSE SERPL-MCNC: 92 MG/DL — SIGNIFICANT CHANGE UP (ref 70–99)
HCT VFR BLD CALC: 45.1 % — SIGNIFICANT CHANGE UP (ref 39–50)
HGB BLD-MCNC: 14.3 G/DL — SIGNIFICANT CHANGE UP (ref 13–17)
MCHC RBC-ENTMCNC: 31 PG — SIGNIFICANT CHANGE UP (ref 27–34)
MCHC RBC-ENTMCNC: 31.8 GM/DL — LOW (ref 32–36)
MCV RBC AUTO: 97.5 FL — SIGNIFICANT CHANGE UP (ref 80–100)
PLATELET # BLD AUTO: 291 K/UL — SIGNIFICANT CHANGE UP (ref 150–400)
POTASSIUM SERPL-MCNC: 3.9 MMOL/L — SIGNIFICANT CHANGE UP (ref 3.5–5.3)
POTASSIUM SERPL-SCNC: 3.9 MMOL/L — SIGNIFICANT CHANGE UP (ref 3.5–5.3)
RBC # BLD: 4.62 M/UL — SIGNIFICANT CHANGE UP (ref 4.2–5.8)
RBC # FLD: 13.2 % — SIGNIFICANT CHANGE UP (ref 10.3–14.5)
SODIUM SERPL-SCNC: 138 MMOL/L — SIGNIFICANT CHANGE UP (ref 135–145)
WBC # BLD: 10.4 K/UL — SIGNIFICANT CHANGE UP (ref 3.8–10.5)
WBC # FLD AUTO: 10.4 K/UL — SIGNIFICANT CHANGE UP (ref 3.8–10.5)

## 2018-12-16 RX ORDER — SACUBITRIL AND VALSARTAN 24; 26 MG/1; MG/1
1 TABLET, FILM COATED ORAL
Qty: 60 | Refills: 0 | OUTPATIENT
Start: 2018-12-16 | End: 2019-01-14

## 2018-12-16 RX ORDER — FUROSEMIDE 40 MG
20 TABLET ORAL DAILY
Qty: 0 | Refills: 0 | Status: DISCONTINUED | OUTPATIENT
Start: 2018-12-16 | End: 2018-12-16

## 2018-12-16 RX ORDER — DIGOXIN 250 MCG
1 TABLET ORAL
Qty: 30 | Refills: 0 | OUTPATIENT
Start: 2018-12-16 | End: 2019-01-14

## 2018-12-16 RX ORDER — CARVEDILOL PHOSPHATE 80 MG/1
1 CAPSULE, EXTENDED RELEASE ORAL
Qty: 60 | Refills: 0 | OUTPATIENT
Start: 2018-12-16 | End: 2019-01-14

## 2018-12-16 RX ADMIN — Medication 81 MILLIGRAM(S): at 12:55

## 2018-12-16 RX ADMIN — ENOXAPARIN SODIUM 40 MILLIGRAM(S): 100 INJECTION SUBCUTANEOUS at 12:55

## 2018-12-16 RX ADMIN — SPIRONOLACTONE 25 MILLIGRAM(S): 25 TABLET, FILM COATED ORAL at 06:02

## 2018-12-16 RX ADMIN — Medication 20 MILLIGRAM(S): at 06:02

## 2018-12-16 RX ADMIN — CARVEDILOL PHOSPHATE 3.12 MILLIGRAM(S): 80 CAPSULE, EXTENDED RELEASE ORAL at 06:02

## 2018-12-16 RX ADMIN — Medication 0.12 MILLIGRAM(S): at 06:02

## 2018-12-16 RX ADMIN — SACUBITRIL AND VALSARTAN 1 TABLET(S): 24; 26 TABLET, FILM COATED ORAL at 06:01

## 2018-12-16 RX ADMIN — PANTOPRAZOLE SODIUM 40 MILLIGRAM(S): 20 TABLET, DELAYED RELEASE ORAL at 06:02

## 2018-12-16 NOTE — PROGRESS NOTE ADULT - ASSESSMENT
48 yr old Male with PMHX of Non-ischemic CM w/ Severe global LVSD, Grade IV DD EF 15%, HTN, HLD presents with CP and SOB x 2 weeks,acute on chronic systolic HF due to non-compliance.  1.Tele monitoring.  2.Outpatient f/u with cardiologist.  3.Cont  lasix 20mg qd, and aldactone 25mg qd.  4.CHF- coreg, entresto, dig .125mg qd.  5.GI and DVT prophylaxis.  6.D/W pt need for compliance with cardiac medication. 48 yr old Male with PMHX of Non-ischemic CM w/ Severe global LVSD, Grade IV DD EF 15%, HTN, HLD presents with CP and SOB x 2 weeks,acute on chronic systolic HF due to non-compliance.  1.Tele monitoring.  2.Outpatient f/u with cardiologist. Not a candidate for AICD eval until compliant with optimum cardia medication for 3 mo.  3.Cont  lasix 20mg qd, and aldactone 25mg qd.  4.CHF- coreg, entresto, dig .125mg qd.  5.GI and DVT prophylaxis.  6.D/W pt need for compliance with cardiac medication.

## 2018-12-16 NOTE — PROGRESS NOTE ADULT - ASSESSMENT
Pt is a 48M, Hx of Systolic CHF w/ Severe global LVSD, Grade IV DD EF 15%, HTN, HLD presents with CP and SOB x 2 weeks.  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  In the ED, pt presents in no acute distress, vitals sig for  remaining WNL, and EKG sinus tach.  Pt remains afebrile w/o leukocytosis.  Remaining labs concerning for trop 0.063 w/ proBNP of 2505.  Symptoms are likely 2/2 decompensated CHF exacerbation given pts medication non-compliance, but ACS should be ruled out.      Pt will be admitted to tele for ACS to r/o NSTEMI, and CHF exacerbation  Pt wanted to go home   Pt stable for discharge as per attending

## 2018-12-16 NOTE — PROGRESS NOTE ADULT - SUBJECTIVE AND OBJECTIVE BOX
CHIEF COMPLAINT:Patient is a 48y old  Male who presents with a chief complaint of CP.Pt appears comfortable.    	  REVIEW OF SYSTEMS:  CONSTITUTIONAL: No fever, weight loss, or fatigue  EYES: No eye pain, visual disturbances, or discharge  ENT:  No difficulty hearing, tinnitus, vertigo; No sinus or throat pain  NECK: No pain or stiffness  RESPIRATORY: No cough, wheezing, chills or hemoptysis; No Shortness of Breath  CARDIOVASCULAR: No chest pain, palpitations, passing out, dizziness, or leg swelling  GASTROINTESTINAL: No abdominal or epigastric pain. No nausea, vomiting, or hematemesis; No diarrhea or constipation. No melena or hematochezia.  GENITOURINARY: No dysuria, frequency, hematuria, or incontinence  NEUROLOGICAL: No headaches, memory loss, loss of strength, numbness, or tremors  SKIN: No itching, burning, rashes, or lesions   LYMPH Nodes: No enlarged glands  ENDOCRINE: No heat or cold intolerance; No hair loss  MUSCULOSKELETAL: No joint pain or swelling; No muscle, back, or extremity pain  PSYCHIATRIC: No depression, anxiety, mood swings, or difficulty sleeping  HEME/LYMPH: No easy bruising, or bleeding gums  ALLERGY AND IMMUNOLOGIC: No hives or eczema	      PHYSICAL EXAM:  T(C): 30.9 (12-15-18 @ 05:16), Max: 37.3 (12-14-18 @ 20:53)  HR: 100 (12-15-18 @ 05:16) (100 - 113)  BP: 102/66 (12-15-18 @ 05:16) (91/58 - 106/65)  RR: 18 (12-15-18 @ 05:16) (15 - 18)  SpO2: 100% (12-15-18 @ 05:16) (98% - 100%)      Appearance: Normal	  HEENT:   Normal oral mucosa, PERRL, EOMI	  Lymphatic: No lymphadenopathy  Cardiovascular: Normal S1 S2, + JVD  Respiratory: Lungs clear to auscultation	  Psychiatry: A & O x 3, Mood & affect appropriate  Gastrointestinal:  Soft, Non-tender, + BS	  Skin: No rashes, No ecchymoses, No cyanosis	  Neurologic: Non-focal  Extremities: Normal range of motion, No clubbing, cyanosis or edema  Vascular: Peripheral pulses palpable 2+ bilaterally    MEDICATIONS  (STANDING):  aspirin  chewable 81 milliGRAM(s) Oral daily  atorvastatin 80 milliGRAM(s) Oral at bedtime  carvedilol 3.125 milliGRAM(s) Oral every 12 hours  digoxin     Tablet 0.125 milliGRAM(s) Oral daily  enoxaparin Injectable 40 milliGRAM(s) SubCutaneous daily  furosemide   Injectable 20 milliGRAM(s) IV Push daily  influenza   Vaccine 0.5 milliLiter(s) IntraMuscular once  pantoprazole    Tablet 40 milliGRAM(s) Oral before breakfast  sacubitril 24 mG/valsartan 26 mG 1 Tablet(s) Oral two times a day  spironolactone 25 milliGRAM(s) Oral daily      TELEMETRY: [id number]	      	  LABS:	 	    CARDIAC MARKERS:  CARDIAC MARKERS ( 14 Dec 2018 10:14 )  0.046 ng/mL / x     / 156 U/L / x     / 1.9 ng/mL  CARDIAC MARKERS ( 14 Dec 2018 00:11 )  0.056 ng/mL / x     / 202 U/L / x     / 2.3 ng/mL  CARDIAC MARKERS ( 13 Dec 2018 18:48 )  0.068 ng/mL / x     / x     / x     / x                                14.3   11.8  )-----------( 294      ( 15 Dec 2018 05:40 )             44.9     12-15    137  |  105  |  17  ----------------------------<  117<H>  4.1   |  25  |  1.00    Ca    8.7      15 Dec 2018 05:40  Phos  3.7     12-14  Mg     2.3     12-14    TPro  7.6  /  Alb  3.3<L>  /  TBili  0.6  /  DBili  x   /  AST  25  /  ALT  28  /  AlkPhos  51  12-13    proBNP: Serum Pro-Brain Natriuretic Peptide: 2505 pg/mL (12-13 @ 18:48)    Lipid Profile: Cholesterol 146  LDL 58  HDL 65      HgA1c: Hemoglobin A1C, Whole Blood: 5.6 % (12-14 @ 09:10)    TSH: Thyroid Stimulating Hormone, Serum: 2.15 uU/mL (12-14 @ 05:57)      	  cath 2016-no cad
INTERVAL HPI/OVERNIGHT EVENTS:seen at [facility], [place],doing better  less sob    VITAL SIGNS:  T(F): 98.2 (12-15-18 @ 11:50)  HR: 103 (12-15-18 @ 11:50)  BP: 104/67 (12-15-18 @ 11:50)  RR: 17 (12-15-18 @ 11:50)  SpO2: 100% (12-15-18 @ 11:50)  Wt(kg): --    PHYSICAL EXAM:    Constitutional:awake,alert  Eyes:  ENMT:perrla  Neck:  Respiratory:few cracles  Cardiovascular:s1 s2,m-none  Gastrointestinal:soft,non-tender  Extremities:mild edema  Vascular:  Neurological:no focal defciit  Musculoskeletal:    MEDICATIONS  (STANDING):  aspirin  chewable 81 milliGRAM(s) Oral daily  atorvastatin 80 milliGRAM(s) Oral at bedtime  carvedilol 3.125 milliGRAM(s) Oral every 12 hours  digoxin     Tablet 0.125 milliGRAM(s) Oral daily  enoxaparin Injectable 40 milliGRAM(s) SubCutaneous daily  furosemide   Injectable 20 milliGRAM(s) IV Push daily  influenza   Vaccine 0.5 milliLiter(s) IntraMuscular once  pantoprazole    Tablet 40 milliGRAM(s) Oral before breakfast  sacubitril 24 mG/valsartan 26 mG 1 Tablet(s) Oral two times a day  spironolactone 25 milliGRAM(s) Oral daily    MEDICATIONS  (PRN):      Allergies    No Known Allergies    Intolerances        LABS:                        14.3   11.8  )-----------( 294      ( 15 Dec 2018 05:40 )             44.9     12-15    137  |  105  |  17  ----------------------------<  117<H>  4.1   |  25  |  1.00    Ca    8.7      15 Dec 2018 05:40  Phos  3.7     12-14  Mg     2.3     12-14    TPro  7.6  /  Alb  3.3<L>  /  TBili  0.6  /  DBili  x   /  AST  25  /  ALT  28  /  AlkPhos  51  12-13       Assessment:  · Assessment		  Pt is a 48M, Hx of Systolic CHF w/ Severe global LVSD, Grade IV DD EF 15%, HTN, HLD presents with CP and SOB x 2 weeks.  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  In the ED, pt presents in no acute distress, vitals sig for  remaining WNL, and EKG sinus tach.  Pt remains afebrile w/o leukocytosis.  Remaining labs concerning for trop 0.063 w/ proBNP of 2505.  Symptoms are likely 2/2 decompensated CHF exacerbation given pts medication non-compliance, but ACS should be ruled out.      Pt will be admitted to tele for ACS to r/o NSTEMI, and CHF exacerbation       Problem/Plan - 1:  ·  Problem: ACS (acute coronary syndrome).  Plan: -CP x 2 weeks on exertion relieved with rest  -History of Systolic CHF w/ EF of 15-20% w/ persistent tachycardia   -Prior cath at [facility] in 2016- in chart  -Echo-pending  -Asa, statin, b-blocker  -Tele monitoring   -Cardio: Dr. Palafox. f/up appret     Problem/Plan - 2:  ·  Problem: Acute on chronic congestive heart failure, unspecified heart failure type.  Plan: -Decompensated HF, likely from medication and dietary non-compliance  -History of Systolic CHF w/ EF of 15-20%, GIV DD w/ persistent tachycardia  -Pt non-compliant with diuretic and b-blocker   -will resume low dose Coreg, and lasix 40IV daily for now  -will continue switch Entresto to Losartan      Problem/Plan - 3:  ·  Problem: HTN (hypertension).  Plan: -Hold antihypertensives with concern for hypotension- c/w lasix and coreg   -Monitor BP  -Dash Diet.      Problem/Plan - 4:  ·  Problem: Hypercholesterolemia.  Plan: -c/w statin therapy  -f/u lipid panel.      Problem/Plan - 5:  ·  Problem: Need for prophylactic measure.  Plan: [] Previous VTE                                                3  [] Thrombophilia                                             2  [] Lower limb paralysis                                   2    [] Current Cancer                                             2   [x] Immobilization > 24 hrs                              1  [] ICU/CCU stay > 24 hours                             1  [x] Age > 60                                                         1    IMPROVE VTE Score: 2; Lovenox sub q for DVT prophy.
Patient is a 48y old  Male who presents with a chief complaint of CP (15 Dec 2018 19:51)      INTERVAL HPI/OVERNIGHT EVENTS: no new events    MEDICATIONS  (STANDING):  aspirin  chewable 81 milliGRAM(s) Oral daily  atorvastatin 80 milliGRAM(s) Oral at bedtime  carvedilol 3.125 milliGRAM(s) Oral every 12 hours  digoxin     Tablet 0.125 milliGRAM(s) Oral daily  enoxaparin Injectable 40 milliGRAM(s) SubCutaneous daily  furosemide   Injectable 20 milliGRAM(s) IV Push daily  influenza   Vaccine 0.5 milliLiter(s) IntraMuscular once  pantoprazole    Tablet 40 milliGRAM(s) Oral before breakfast  sacubitril 24 mG/valsartan 26 mG 1 Tablet(s) Oral two times a day  spironolactone 25 milliGRAM(s) Oral daily    MEDICATIONS  (PRN):      Allergies    No Known Allergies    Intolerances        REVIEW OF SYSTEMS:  CONSTITUTIONAL: No fever, weight loss, or fatigue  RESPIRATORY: No cough, wheezing, chills or hemoptysis; No shortness of breath  CARDIOVASCULAR: No chest pain, palpitations, dizziness, or leg swelling  GASTROINTESTINAL: No abdominal or epigastric pain. No nausea, vomiting, or hematemesis; No diarrhea or constipation. No melena or hematochezia.  NEUROLOGICAL: No headaches, memory loss, loss of strength, numbness, or tremors  SKIN: No itching, burning, rashes, or lesions     Vital Signs Last 24 Hrs  T(C): 36.7 (16 Dec 2018 02:40), Max: 37.2 (15 Dec 2018 21:55)  T(F): 98.1 (16 Dec 2018 02:40), Max: 98.9 (15 Dec 2018 21:55)  HR: 104 (16 Dec 2018 02:40) (91 - 111)  BP: 99/54 (16 Dec 2018 02:40) (89/56 - 112/75)  BP(mean): --  RR: 18 (16 Dec 2018 02:40) (17 - 18)  SpO2: 97% (16 Dec 2018 02:40) (97% - 100%)    PHYSICAL EXAM:  GENERAL: NAD,  HEAD:  Atraumatic, Normocephalic  EYES: EOMI, PERRLA, conjunctiva and sclera clear  NECK: Supple, No JVD, Normal thyroid  CHEST/LUNG: Clear to percussion bilaterally; No rales, rhonchi, wheezing, or rubs  HEART: Regular rate and rhythm; No murmurs, rubs, or gallops  ABDOMEN: Soft, Nontender, Nondistended; Bowel sounds present  NERVOUS SYSTEM:  Alert & Oriented X3, Good concentration; Motor Strength 5/5 B/L   EXTREMITIES:  2+ Peripheral Pulses, No clubbing, cyanosis, or edema  SKIN;    LABS:                        14.3   11.8  )-----------( 294      ( 15 Dec 2018 05:40 )             44.9     12-15    137  |  105  |  17  ----------------------------<  117<H>  4.1   |  25  |  1.00    Ca    8.7      15 Dec 2018 05:40  Phos  3.7     12-14  Mg     2.3     12-14          CAPILLARY BLOOD GLUCOSE          RADIOLOGY & ADDITIONAL TESTS:
CHIEF COMPLAINT:Patient is a 48y old  Male who presents with a chief complaint of CP .Pt appears comfortable.    	  REVIEW OF SYSTEMS:  CONSTITUTIONAL: No fever, weight loss, or fatigue  EYES: No eye pain, visual disturbances, or discharge  ENT:  No difficulty hearing, tinnitus, vertigo; No sinus or throat pain  NECK: No pain or stiffness  RESPIRATORY: No cough, wheezing, chills or hemoptysis; No Shortness of Breath  CARDIOVASCULAR: No chest pain, palpitations, passing out, dizziness, or leg swelling  GASTROINTESTINAL: No abdominal or epigastric pain. No nausea, vomiting, or hematemesis; No diarrhea or constipation. No melena or hematochezia.  GENITOURINARY: No dysuria, frequency, hematuria, or incontinence  NEUROLOGICAL: No headaches, memory loss, loss of strength, numbness, or tremors  SKIN: No itching, burning, rashes, or lesions   LYMPH Nodes: No enlarged glands  ENDOCRINE: No heat or cold intolerance; No hair loss  MUSCULOSKELETAL: No joint pain or swelling; No muscle, back, or extremity pain  PSYCHIATRIC: No depression, anxiety, mood swings, or difficulty sleeping  HEME/LYMPH: No easy bruising, or bleeding gums  ALLERGY AND IMMUNOLOGIC: No hives or eczema	      PHYSICAL EXAM:  T(C): 36.6 (12-16-18 @ 05:33), Max: 37.2 (12-15-18 @ 21:55)  HR: 110 (12-16-18 @ 05:33) (91 - 111)  BP: 109/63 (12-16-18 @ 05:33) (89/56 - 112/75)  RR: 18 (12-16-18 @ 05:33) (17 - 18)  SpO2: 100% (12-16-18 @ 05:33) (97% - 100%)  Wt(kg): --  I&O's Summary      Appearance: Normal	  HEENT:   Normal oral mucosa, PERRL, EOMI	  Lymphatic: No lymphadenopathy  Cardiovascular: Normal S1 S2, No JVD, No murmurs, No edema  Respiratory: Lungs clear to auscultation	  Psychiatry: A & O x 3, Mood & affect appropriate  Gastrointestinal:  Soft, Non-tender, + BS	  Skin: No rashes, No ecchymoses, No cyanosis	  Neurologic: Non-focal  Extremities: Normal range of motion, No clubbing, cyanosis or edema  Vascular: Peripheral pulses palpable 2+ bilaterally    MEDICATIONS  (STANDING):  aspirin  chewable 81 milliGRAM(s) Oral daily  atorvastatin 80 milliGRAM(s) Oral at bedtime  carvedilol 3.125 milliGRAM(s) Oral every 12 hours  digoxin     Tablet 0.125 milliGRAM(s) Oral daily  enoxaparin Injectable 40 milliGRAM(s) SubCutaneous daily  furosemide   Injectable 20 milliGRAM(s) IV Push daily  influenza   Vaccine 0.5 milliLiter(s) IntraMuscular once  pantoprazole    Tablet 40 milliGRAM(s) Oral before breakfast  sacubitril 24 mG/valsartan 26 mG 1 Tablet(s) Oral two times a day  spironolactone 25 milliGRAM(s) Oral daily      TELEMETRY: sinus tach	      	  LABS:	 	                       14.3   10.4  )-----------( 291      ( 16 Dec 2018 06:05 )             45.1     12-16    138  |  106  |  18  ----------------------------<  92  3.9   |  25  |  0.89    Ca    8.9      16 Dec 2018 06:05      proBNP: Serum Pro-Brain Natriuretic Peptide: 2505 pg/mL (12-13 @ 18:48)    Lipid Profile: Cholesterol 146  LDL 58  HDL 65      HgA1c: Hemoglobin A1C, Whole Blood: 5.6 % (12-14 @ 09:10)    TSH: Thyroid Stimulating Hormone, Serum: 2.15 uU/mL (12-14 @ 05:57)      	  OBSERVATIONS:  Mitral Valve: Tethered mitral valve leaflets. Moderate  mitral regurgitation.  Aortic Root: Normal aortic root.  Aortic Valve: Aortic valve not well visualized. No aortic  stenosis. No aortic valve regurgitation seen.  Left Atrium: Normal left atrium.  LA volume index = 29  cc/m2.  Left Ventricle: Endocardium not well visualized; grossly  severely depressed left ventricular systolic function (EF  10% by visual estimation). Moderate left ventricular  enlargement. Diastolic function incompletely assessed.  Right Heart: Normal right atrium. Normal right ventricular  size and systolic function (TAPSE 2.1 cm). Tricuspid valve  not well visualized, probably normal. Trace tricuspid  regurgitation. Pulmonic valve not well seen.Trace pulmonic  insufficiency is noted.  Pericardium/PleuraNo pericardial effusion.  Hemodynamic: RA Pressure is 8 mm Hg. RV systolic pressure  is moderately increased at  52 mm Hg.

## 2018-12-16 NOTE — PROGRESS NOTE ADULT - PROBLEM SELECTOR PLAN 1
-CP x 2 weeks on exertion relieved with rest  -History of Systolic CHF w/ EF of 15-20% w/ persistent tachycardia   -Prior cath at [facility] in 2016- in chart  -Echo: Sinus tach appears unchanged from prior  -Trop 1 0.068-->0.056-->0.046  -Asa, statin, b-blocker  -Tele monitoring   -Cardio: Dr. Palafox

## 2018-12-16 NOTE — PROGRESS NOTE ADULT - PROBLEM SELECTOR PLAN 2
-Decompensated HF, likely from medication and dietary non-compliance  -History of Systolic CHF w/ EF of 15-20%, GIV DD w/ persistent tachycardia  -Pt non-compliant with diuretic and b-blocker   -TTE : ef 10%  - will dc pt on Lasix 20 PO ,aldactone 25 mg, coreg , entresto and dig .125 mg  -Low Sodium Diet w/ fluid restriction   - Compliance reenforced  -Cardio: Dr. Palafox

## 2019-02-04 PROBLEM — E78.00 PURE HYPERCHOLESTEROLEMIA, UNSPECIFIED: Chronic | Status: ACTIVE | Noted: 2018-12-13

## 2019-02-04 PROBLEM — I10 ESSENTIAL (PRIMARY) HYPERTENSION: Chronic | Status: ACTIVE | Noted: 2018-12-13

## 2019-02-04 PROBLEM — I50.42 CHRONIC COMBINED SYSTOLIC (CONGESTIVE) AND DIASTOLIC (CONGESTIVE) HEART FAILURE: Chronic | Status: ACTIVE | Noted: 2018-12-14

## 2019-02-11 ENCOUNTER — APPOINTMENT (OUTPATIENT)
Dept: HEART AND VASCULAR | Facility: CLINIC | Age: 49
End: 2019-02-11
Payer: MEDICAID

## 2019-02-11 VITALS
HEART RATE: 99 BPM | OXYGEN SATURATION: 99 % | TEMPERATURE: 98.8 F | DIASTOLIC BLOOD PRESSURE: 69 MMHG | WEIGHT: 181.99 LBS | BODY MASS INDEX: 26.96 KG/M2 | SYSTOLIC BLOOD PRESSURE: 109 MMHG | HEIGHT: 69 IN

## 2019-02-11 PROCEDURE — 36415 COLL VENOUS BLD VENIPUNCTURE: CPT

## 2019-02-11 PROCEDURE — 93000 ELECTROCARDIOGRAM COMPLETE: CPT

## 2019-02-11 PROCEDURE — 99214 OFFICE O/P EST MOD 30 MIN: CPT

## 2019-02-11 NOTE — HISTORY OF PRESENT ILLNESS
[FreeTextEntry1] : 49 yo M with idiopathic DCM, LVEF now 10% and mod-severe MR (6/21/16) returns today for a routine follow up. Repeat TTE on 6/12/17 revealed LVEF 15-20%, despite maximal HF GDMT. He was evaluated by EP who recommended a BiV ICD as well as got a second opinion who recommended to go for the procedure but he refused at this time. Has not followed up since 1/2018. Since seen last year, he was hospitalized in 12/2018 at [facility] for acute decompensated HF likely in the setting of medication non-compliance.\par \par Since his hospitalization in December, he reports feeling well. He reports compliance with his medications except Coreg as he wasn't sure what it was for and read the side effects which scared him. 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  He denies CP, SOB at rest, orthopnea, PND, LE edema, abdominal discomfort, LH/dizziness, palpitations, and syncope. No change in bowel or bladder habits. He still tries to restrict his salt and fluid intake and he has not been hospitalized or to the ED since December.

## 2019-02-11 NOTE — PHYSICAL EXAM
[General Appearance - Well Developed] : well developed [Normal Appearance] : normal appearance [Well Groomed] : well groomed [General Appearance - Well Nourished] : well nourished [Eyelids - No Xanthelasma] : the eyelids demonstrated no xanthelasmas [No Oral Cyanosis] : no oral cyanosis [Respiration, Rhythm And Depth] : normal respiratory rhythm and effort [Auscultation Breath Sounds / Voice Sounds] : lungs were clear to auscultation bilaterally [Heart Rate And Rhythm] : heart rate and rhythm were normal [Heart Sounds] : normal S1 and S2 [Murmurs] : no murmurs present [Edema] : no peripheral edema present [2+] : left 2+ [Bowel Sounds] : normal bowel sounds [Abdomen Soft] : soft [Abdomen Tenderness] : non-tender [Abnormal Walk] : normal gait [Nail Clubbing] : no clubbing of the fingernails [Cyanosis, Localized] : no localized cyanosis [Skin Turgor] : normal skin turgor [] : no rash [No Venous Stasis] : no venous stasis [Oriented To Time, Place, And Person] : oriented to person, place, and time [Impaired Insight] : insight and judgment were intact [Affect] : the affect was normal [Mood] : the mood was normal [FreeTextEntry1] : JVP < 6 cm H2O, no HJR

## 2019-02-11 NOTE — REASON FOR VISIT
[Follow-Up - Clinic] : a clinic follow-up of [Heart Failure] : congestive heart failure [FreeTextEntry1] : PATIENT INSTRUCTIONS:\par \par 1. Labs today.\par \par 2. START CARVEDILOL 3.125mg TWICE daily.\par \par 3. Increase the ENTRESTO to 49/51mg TWICE daily.\par \par 4. Please make an appointment with Dr. Holly in regards to the defibrillator.\par \par 5. Follow up in 2 weeks.

## 2019-02-11 NOTE — DISCUSSION/SUMMARY
[FreeTextEntry1] : 47 yo M with NICM, EF now 10% who was lost to f/u for the past year and was hospitalized in December at [facility] for acute decompensated HF in the setting of medication non-compliance. He is ACC/AHA Stage C, NYHA Class II HF, euvolemic on exam. I have recommended the following:\par \par Chronic Systolic HF- Stable and well compensated today. Will check labs. DCd on lower doses of medication- will begin to uptitrate. Increase Entresto to 49/51mg BID. Initiate Coreg 3.125mg BID. Continue Lasix 20mg daily and Spironolactone 25mg. Does not yet have ICD- to f/u with Dr. Holly.\par \par Hyperlipidemia- Lipitor 80mg daily.\par \par LBBB- Evaluated by EP last year given his low EF despite maximal GDMT and LBBB recommended for CRT-D. He is now more willing to get ICD. He will f/u with Dr. Holly in 1-2 weeks.\par \par Follow with Elsi every 2 weeks for medication uptitration.\par

## 2019-02-12 LAB
ANION GAP SERPL CALC-SCNC: 14 MMOL/L
BUN SERPL-MCNC: 14 MG/DL
CALCIUM SERPL-MCNC: 10.1 MG/DL
CHLORIDE SERPL-SCNC: 101 MMOL/L
CO2 SERPL-SCNC: 26 MMOL/L
CREAT SERPL-MCNC: 0.89 MG/DL
GLUCOSE SERPL-MCNC: 102 MG/DL
NT-PROBNP SERPL-MCNC: 1101 PG/ML
POTASSIUM SERPL-SCNC: 4.1 MMOL/L
SODIUM SERPL-SCNC: 141 MMOL/L

## 2019-02-25 ENCOUNTER — APPOINTMENT (OUTPATIENT)
Dept: HEART AND VASCULAR | Facility: CLINIC | Age: 49
End: 2019-02-25
Payer: MEDICAID

## 2019-02-25 VITALS
HEIGHT: 69 IN | WEIGHT: 182.98 LBS | TEMPERATURE: 98.7 F | DIASTOLIC BLOOD PRESSURE: 70 MMHG | SYSTOLIC BLOOD PRESSURE: 115 MMHG | BODY MASS INDEX: 27.1 KG/M2 | OXYGEN SATURATION: 97 % | HEART RATE: 116 BPM

## 2019-02-25 PROCEDURE — 93000 ELECTROCARDIOGRAM COMPLETE: CPT

## 2019-02-25 PROCEDURE — 36415 COLL VENOUS BLD VENIPUNCTURE: CPT

## 2019-02-25 PROCEDURE — 99214 OFFICE O/P EST MOD 30 MIN: CPT

## 2019-02-25 NOTE — HISTORY OF PRESENT ILLNESS
[FreeTextEntry1] : 47 yo M with idiopathic DCM, LVEF now 10% and mod-severe MR (6/21/16) returns today for a routine follow up. Repeat TTE on 6/12/17 revealed LVEF 15-20%, despite maximal HF GDMT. He was evaluated by EP who recommended a BiV ICD as well as got a second opinion who recommended to go for the procedure but he refused at this time. \par \par Since seen two weeks ago, he feels great overall. He continues to have no limitations and works full time. However, he has had a very bothersome cough for 2 days now and reports chills last night. He did not check his temperature.  He denies any sick contacts or recent travel.  He denies CP, SOB at rest, orthopnea, PND, LE edema, abdominal discomfort, LH/dizziness, palpitations, and syncope. No change in bowel or bladder habits. He still tries to restrict his salt and fluid intake and he has not been hospitalized or to the ED since December.

## 2019-02-25 NOTE — REASON FOR VISIT
[Follow-Up - Clinic] : a clinic follow-up of [Heart Failure] : congestive heart failure [FreeTextEntry1] : PATIENT INSTRUCTIONS:\par \par 1. Labs today.\par \par 2. Please INCREASE the CARVEDILOL to 6.25mg TWICE daily. \par \par 3. Please INCREASE the ENTRESTO to 97/103mg TWICE daily.\par \par 5. Follow up in 2 weeks.

## 2019-02-25 NOTE — DISCUSSION/SUMMARY
[FreeTextEntry1] : 47 yo M with NICM, EF now 10% who was lost to f/u for the past year but is now back on good medications and we are continuing uptitration. He is ACC/AHA Stage C, NYHA Class II HF, euvolemic on exam. I have recommended the following:\par \par Chronic Systolic HF- Stable and well compensated today. Check labs today. Increase Coreg to 6.25mg BID. Increase Entresto to 97/103mg BID. Continue Spironolactone and Lasix.Does not yet have ICD- to f/u with Dr. Holly.\par \par Hyperlipidemia- Lipitor 80mg daily.\par \par LBBB- Evaluated by EP last year given his low EF despite maximal GDMT and LBBB recommended for CRT-D. He is now more willing to get ICD. He will f/u with Dr. Holly this week.\par \par Cough- afebrile. Likely viral. Recommended OTC treatment. If it continues to worsen, he will call.\par \par Follow with Elsi every 2 weeks for medication uptitration.\par

## 2019-02-26 LAB
ANION GAP SERPL CALC-SCNC: 15 MMOL/L
BUN SERPL-MCNC: 13 MG/DL
CALCIUM SERPL-MCNC: 9.1 MG/DL
CHLORIDE SERPL-SCNC: 99 MMOL/L
CO2 SERPL-SCNC: 26 MMOL/L
CREAT SERPL-MCNC: 1.01 MG/DL
GLUCOSE SERPL-MCNC: 91 MG/DL
POTASSIUM SERPL-SCNC: 4.5 MMOL/L
SODIUM SERPL-SCNC: 140 MMOL/L

## 2019-02-28 ENCOUNTER — APPOINTMENT (OUTPATIENT)
Dept: HEART AND VASCULAR | Facility: CLINIC | Age: 49
End: 2019-02-28
Payer: MEDICAID

## 2019-02-28 ENCOUNTER — NON-APPOINTMENT (OUTPATIENT)
Age: 49
End: 2019-02-28

## 2019-02-28 VITALS
SYSTOLIC BLOOD PRESSURE: 92 MMHG | WEIGHT: 182 LBS | HEIGHT: 69 IN | BODY MASS INDEX: 26.96 KG/M2 | DIASTOLIC BLOOD PRESSURE: 60 MMHG

## 2019-02-28 PROCEDURE — 93000 ELECTROCARDIOGRAM COMPLETE: CPT

## 2019-02-28 PROCEDURE — 99215 OFFICE O/P EST HI 40 MIN: CPT | Mod: 25

## 2019-03-05 NOTE — PHYSICAL EXAM
[General Appearance - Well Developed] : well developed [Normal Appearance] : normal appearance [Well Groomed] : well groomed [General Appearance - Well Nourished] : well nourished [No Deformities] : no deformities [General Appearance - In No Acute Distress] : no acute distress [Normal Conjunctiva] : the conjunctiva exhibited no abnormalities [Normal Oropharynx] : normal oropharynx [Normal Jugular Venous V Waves Present] : normal jugular venous V waves present [Respiration, Rhythm And Depth] : normal respiratory rhythm and effort [Exaggerated Use Of Accessory Muscles For Inspiration] : no accessory muscle use [Auscultation Breath Sounds / Voice Sounds] : lungs were clear to auscultation bilaterally [Heart Rate And Rhythm] : heart rate and rhythm were normal [Heart Sounds] : normal S1 and S2 [Murmurs] : no murmurs present [Edema] : no peripheral edema present [Abdomen Soft] : soft [Abnormal Walk] : normal gait [Gait - Sufficient For Exercise Testing] : the gait was sufficient for exercise testing [Cyanosis, Localized] : no localized cyanosis [Oriented To Time, Place, And Person] : oriented to person, place, and time [Impaired Insight] : insight and judgment were intact [Affect] : the affect was normal [Mood] : the mood was normal [No Anxiety] : not feeling anxious [] : no ischemic changes [Skin Color & Pigmentation] : normal skin color and pigmentation

## 2019-03-11 ENCOUNTER — APPOINTMENT (OUTPATIENT)
Dept: HEART AND VASCULAR | Facility: CLINIC | Age: 49
End: 2019-03-11

## 2019-03-14 NOTE — REVIEW OF SYSTEMS
[see HPI] : see HPI [Negative] : Heme/Lymph [Feeling Fatigued] : not feeling fatigued [Cough] : no cough [Wheezing] : no wheezing [Coughing Up Blood] : no hemoptysis

## 2019-03-14 NOTE — DISCUSSION/SUMMARY
[FreeTextEntry1] : Mr. Cheema is a 48 year old M LBBB and non ischemic cardiomyopathy, now on optimal heart failure medications, who presents for follow up.  We spoke about non ischemic cardiomyopathy and the association with sudden cardiac death and ventricular arrhythmias.  He is compliant with optimal heart failure medications with no improvement in his EF and a LBBB.  We discussed what a BiV ICD is and the potential long term benefits in symptoms and survival.  He currently is asymptomatic from his depressed ejection fraction and became upset that his ejection fraction has not improved on medications.  We spoke about compensated heart failure and the fact that being compliant with medications and a BiV device will help him stay healthy.  I have told him that for every 6 ICDs placed one persons life is saved over a 5 year period from an ICD.  I have also quoted him a 70-80% improvement in long term heart failure symptoms with a BiV.  We also spoke about the fact that a BiV would be beneficial in conjunction with optimal heart failure medication.  A thorough discussion was had with the patient and his family concerning all aspects of ICD therapy. We reviewed the data supporting ICD therapy and how it applies individually. We discussed the procedures, risks and outcomes of ICD implantation an living with an ICD. We discussed management of ICD therapy throughout life, including deactivation of the ICD. After all questions were answered, it was a shared decision to proceed with ICD therapy.  He knows to call with any questions or concerns in the interm.  \par \par

## 2019-03-14 NOTE — HISTORY OF PRESENT ILLNESS
[FreeTextEntry1] : Mr. Cheema is a 48 year old M LBBB and non ischemic cardiomyopathy, now on optimal heart failure medications, who presents for follow up.\par \par  Mr. Cheema was diagnosed with systolic heart failure about 3 years ago.  He was found to have normal coronaries and over the past year has been uptitrated on optimal heart failure medication.  He was seen in our office and was offered a BiV in 2017 but felt that he was too young and healthy and deferred it.  He has been following up with heart failure and has been compliant with all medications.  Last echo 12/2018 EF 10%.  He however, experienced SOB in December and was hospitalized for a heart failure exacerbation.  He was diuresed and discharged.  Since then he has been doing well but he realized he needs a device and would like to proceed.  He denies any limitations in exercise tolerance.  Denies BLEVINS, chest pain, palpitations, orthopnea, PND, syncope or near syncope.  \par  \par  \par \par Echo: 6/12/17 TTE: LVEDD 6.4 cm, LVEF 15-20%, Mild to mod MR,  LA mildly dilated, normal RV size and function, Trace TR.\par Echo 12/19 EF 10%\par \par Echo 6/18/16 TTE: LVEDD 6.7 cm, LVEF 15%, mod-severe MR, grade 4 diastolic dysfunction, normal RV size and function, mild TR.\par \par Cardiac Cath: 6/20/16  Ao 89/70/79. No coronary artery disease. RHC: RA 12, RV 39/13, PA 37/21/30, PCW 24, CO/CI (TD) 2.6/1.3, PVR 2.31, SVR 2063, PA 43.7%. \par

## 2019-03-25 ENCOUNTER — INPATIENT (INPATIENT)
Facility: HOSPITAL | Age: 49
LOS: 0 days | Discharge: HOME | DRG: 227 | End: 2019-03-26
Attending: INTERNAL MEDICINE | Admitting: INTERNAL MEDICINE
Payer: MEDICAID

## 2019-03-25 VITALS
RESPIRATION RATE: 16 BRPM | HEART RATE: 71 BPM | OXYGEN SATURATION: 98 % | TEMPERATURE: 97 F | SYSTOLIC BLOOD PRESSURE: 91 MMHG | DIASTOLIC BLOOD PRESSURE: 54 MMHG

## 2019-03-25 PROCEDURE — 33255 ABLATE ATRIA W/O BYPASS EXT: CPT

## 2019-03-25 PROCEDURE — 33249 INSJ/RPLCMT DEFIB W/LEAD(S): CPT

## 2019-03-25 PROCEDURE — 99223 1ST HOSP IP/OBS HIGH 75: CPT | Mod: 25

## 2019-03-25 PROCEDURE — 93010 ELECTROCARDIOGRAM REPORT: CPT

## 2019-03-25 PROCEDURE — 93641 EP EVL 1/2CHMB PAC CVDFB TST: CPT | Mod: 26

## 2019-03-25 RX ORDER — SACUBITRIL AND VALSARTAN 24; 26 MG/1; MG/1
1 TABLET, FILM COATED ORAL EVERY 12 HOURS
Qty: 0 | Refills: 0 | Status: DISCONTINUED | OUTPATIENT
Start: 2019-03-25 | End: 2019-03-26

## 2019-03-25 RX ORDER — ACETAMINOPHEN 500 MG
650 TABLET ORAL EVERY 4 HOURS
Qty: 0 | Refills: 0 | Status: DISCONTINUED | OUTPATIENT
Start: 2019-03-25 | End: 2019-03-26

## 2019-03-25 RX ORDER — CEFAZOLIN SODIUM 1 G
1000 VIAL (EA) INJECTION ONCE
Qty: 0 | Refills: 0 | Status: COMPLETED | OUTPATIENT
Start: 2019-03-25 | End: 2019-03-25

## 2019-03-25 RX ORDER — ATORVASTATIN CALCIUM 80 MG/1
80 TABLET, FILM COATED ORAL AT BEDTIME
Qty: 0 | Refills: 0 | Status: DISCONTINUED | OUTPATIENT
Start: 2019-03-25 | End: 2019-03-26

## 2019-03-25 RX ORDER — ASPIRIN/CALCIUM CARB/MAGNESIUM 324 MG
81 TABLET ORAL DAILY
Qty: 0 | Refills: 0 | Status: DISCONTINUED | OUTPATIENT
Start: 2019-03-26 | End: 2019-03-26

## 2019-03-25 RX ORDER — CEFAZOLIN SODIUM 1 G
1000 VIAL (EA) INJECTION EVERY 8 HOURS
Qty: 0 | Refills: 0 | Status: COMPLETED | OUTPATIENT
Start: 2019-03-25 | End: 2019-03-26

## 2019-03-25 RX ORDER — SPIRONOLACTONE 25 MG/1
25 TABLET, FILM COATED ORAL DAILY
Qty: 0 | Refills: 0 | Status: DISCONTINUED | OUTPATIENT
Start: 2019-03-26 | End: 2019-03-26

## 2019-03-25 RX ORDER — LANOLIN ALCOHOL/MO/W.PET/CERES
5 CREAM (GRAM) TOPICAL AT BEDTIME
Qty: 0 | Refills: 0 | Status: DISCONTINUED | OUTPATIENT
Start: 2019-03-25 | End: 2019-03-26

## 2019-03-25 RX ORDER — CARVEDILOL PHOSPHATE 80 MG/1
6.25 CAPSULE, EXTENDED RELEASE ORAL
Qty: 0 | Refills: 0 | Status: DISCONTINUED | OUTPATIENT
Start: 2019-03-25 | End: 2019-03-26

## 2019-03-25 RX ORDER — FUROSEMIDE 40 MG
20 TABLET ORAL DAILY
Qty: 0 | Refills: 0 | Status: DISCONTINUED | OUTPATIENT
Start: 2019-03-26 | End: 2019-03-26

## 2019-03-25 RX ADMIN — Medication 650 MILLIGRAM(S): at 17:53

## 2019-03-25 RX ADMIN — Medication 100 MILLIGRAM(S): at 12:30

## 2019-03-25 RX ADMIN — Medication 650 MILLIGRAM(S): at 19:10

## 2019-03-25 RX ADMIN — SACUBITRIL AND VALSARTAN 1 TABLET(S): 24; 26 TABLET, FILM COATED ORAL at 20:19

## 2019-03-25 RX ADMIN — Medication 100 MILLIGRAM(S): at 20:19

## 2019-03-25 RX ADMIN — ATORVASTATIN CALCIUM 80 MILLIGRAM(S): 80 TABLET, FILM COATED ORAL at 20:19

## 2019-03-25 NOTE — H&P ADULT - ASSESSMENT
48 year old M with baseline LBBB ( ms) and non ischemic cardiomyopathy with LVEF 15-20%, now on optimal heart failure medications, here for BiV-ICD implant.   - Awaiting for BiV-ICD today.   - He took all home meds today   - CXR and device check tomorrow.

## 2019-03-25 NOTE — H&P ADULT - NSICDXPASTMEDICALHX_GEN_ALL_CORE_FT
PAST MEDICAL HISTORY:  CHF (congestive heart failure), NYHA class IV, chronic, combined     HTN (hypertension)     Hypercholesterolemia     Renal stones

## 2019-03-25 NOTE — H&P ADULT - HISTORY OF PRESENT ILLNESS
48 year old M with baseline LBBB ( ms) and non ischemic cardiomyopathy, now on optimal heart failure medications, here for BiV-ICD implant.     He was diagnosed with systolic heart failure about 3 years ago.  He was found to have normal coronaries and over the past year has been uptitrated on optimal heart failure medication.  He was seen in our office and was offered a BiV in 2017 but felt that he was too young and healthy and deferred it.  He has been following up with heart failure and has been compliant with all medications.  Last echo 12/2018 EF 10%.  He however, experienced SOB in December 2018 and was hospitalized for a heart failure exacerbation.  He was diuresed and discharged.  Since then he has been doing well but he realized he needs a device and would like to proceed.  He denies any limitations in exercise tolerance.  Denies BLEVINS, chest pain, palpitations, orthopnea, PND, syncope or near syncope.       Echo: 6/12/17 TTE: LVEDD 6.4 cm, LVEF 15-20%, Mild to mod MR,  LA mildly dilated, normal RV size and function, Trace TR.  Echo 12/19 EF 10%  Echo 6/18/16 TTE: LVEDD 6.7 cm, LVEF 15%, mod-severe MR, grade 4 diastolic dysfunction, normal RV size and function, mild TR.  Cardiac Cath: 6/20/16  Ao 89/70/79. No coronary artery disease. RHC: RA 12, RV 39/13, PA 37/21/30, PCW 24, CO/CI (TD) 2.6/1.3, PVR 2.31, SVR 2063, PA 43.7%.

## 2019-03-25 NOTE — H&P ADULT - NSHPSOCIALHISTORY_GEN_ALL_CORE
ETOH: socially (2 drinks per week -- red wine, occasionally whiskey)   TOB: in the past few years smoked 3 cig/day. for the past 1 year, cut down to 1 cig/day   Illicit drugs: denies

## 2019-03-25 NOTE — H&P ADULT - NSICDXFAMILYHX_GEN_ALL_CORE_FT
FAMILY HISTORY:  Father  Still living? Unknown  Family history of cerebrovascular accident (CVA), Age at diagnosis: Age Unknown

## 2019-03-26 ENCOUNTER — TRANSCRIPTION ENCOUNTER (OUTPATIENT)
Age: 49
End: 2019-03-26

## 2019-03-26 VITALS — TEMPERATURE: 98 F

## 2019-03-26 PROCEDURE — 71046 X-RAY EXAM CHEST 2 VIEWS: CPT | Mod: 26

## 2019-03-26 PROCEDURE — 93284 PRGRMG EVAL IMPLANTABLE DFB: CPT | Mod: 26

## 2019-03-26 PROCEDURE — 99239 HOSP IP/OBS DSCHRG MGMT >30: CPT

## 2019-03-26 RX ORDER — ACETAMINOPHEN 500 MG
2 TABLET ORAL
Qty: 0 | Refills: 0 | DISCHARGE
Start: 2019-03-26

## 2019-03-26 RX ADMIN — Medication 650 MILLIGRAM(S): at 04:38

## 2019-03-26 RX ADMIN — Medication 650 MILLIGRAM(S): at 05:57

## 2019-03-26 RX ADMIN — CARVEDILOL PHOSPHATE 6.25 MILLIGRAM(S): 80 CAPSULE, EXTENDED RELEASE ORAL at 06:06

## 2019-03-26 RX ADMIN — Medication 100 MILLIGRAM(S): at 04:38

## 2019-03-26 RX ADMIN — Medication 81 MILLIGRAM(S): at 08:37

## 2019-03-26 RX ADMIN — SACUBITRIL AND VALSARTAN 1 TABLET(S): 24; 26 TABLET, FILM COATED ORAL at 08:35

## 2019-03-26 NOTE — DISCHARGE NOTE PROVIDER - CARE PROVIDER_API CALL
Mikel Holly)  Cardiac Electrophysiology; Cardiology; Cardiovascular Disease  [address], [address] [place] [address]  Phone: [phone]  Fax: [phone]  Follow Up Time:

## 2019-03-26 NOTE — DISCHARGE NOTE PROVIDER - HOSPITAL COURSE
48 year old M with baseline LBBB ( ms) and non ischemic cardiomyopathy, now on optimal heart failure medications, here for BiV-ICD implant.         He was diagnosed with systolic heart failure about 3 years ago.  He was found to have normal coronaries and over the past year has been uptitrated on optimal heart failure medication.  He was seen in our office and was offered a BiV in 2017 but felt that he was too young and healthy and deferred it.  He has been following up with heart failure and has been compliant with all medications.  Last echo 12/2018 EF 10%.  He however, experienced SOB in December 2018 and was hospitalized for a heart failure exacerbation.  He was diuresed and discharged.  Since then he has been doing well but he realized he needs a device and would like to proceed.  He denies any limitations in exercise tolerance.  Denies BLEVINS, chest pain, palpitations, orthopnea, PND, syncope or near syncope.      Patient had successful implantation of BiV ICD [facility], there were no complications.    Post implant ICD check showed normal function BiVICD    Site of implant dry/clean, no hematoma, no bleeding, no swelling.     CXR done reviewed no pneumothorax notes as per radiology.     Patient was stable for discharge

## 2019-03-26 NOTE — DISCHARGE NOTE PROVIDER - NSDCCPTREATMENT_GEN_ALL_CORE_FT
PRINCIPAL PROCEDURE  Procedure: Insertion, cardiac defibrillator, biventricular  Findings and Treatment:

## 2019-03-26 NOTE — PROGRESS NOTE ADULT - SUBJECTIVE AND OBJECTIVE BOX
EPS Device interrogation    Indication: post implant    Device model: TrueFacet SCi				Implanting Physician: Dr. Holly    Functioning Mode: DDD			    Underlying Rhythm: AS/BIVP    Pacemaker dependency:  No    Battery status: 100% (BAYLEE), 8 years  Interrogating parameters:   				RA			RV			LV    Sense:                                        12.2mV                           20.6 mV                       25.0  mV    Threshold:                             0.4 V @ 0.5 ms                 0.3 V @ 0.5 ms            1.6 V @ 0.5 ms    Pacing Impedance:                         784om                              467om                        1199 om    Shock Impedance:                                                                      67  om    Events/Alert:  none    Parameter change: 	none    For ICD only:  tachy therapy – unchanged   Normal function ICD  site of implant dry/clean, no bleeding, no hematoma, no swelling    [ ]EPS attending: Interrogation reviewed. Agree with above.

## 2019-03-26 NOTE — DISCHARGE NOTE PROVIDER - NSDCACTIVITY_GEN_ALL_CORE
No heavy lifting/straining/Walking - Indoors allowed/Stairs allowed/Walking - Outdoors allowed/Showering allowed

## 2019-03-26 NOTE — DISCHARGE NOTE NURSING/CASE MANAGEMENT/SOCIAL WORK - NSDCDPATPORTLINK_GEN_ALL_CORE
You can access the [facility], offered by [facility], by registering with the following website: [email or web address]

## 2019-03-26 NOTE — DISCHARGE NOTE PROVIDER - NSDCCPCAREPLAN_GEN_ALL_CORE_FT
PRINCIPAL DISCHARGE DIAGNOSIS  Diagnosis: Non-ischemic cardiomyopathy  Assessment and Plan of Treatment:

## 2019-04-01 DIAGNOSIS — I44.7 LEFT BUNDLE-BRANCH BLOCK, UNSPECIFIED: ICD-10-CM

## 2019-04-01 DIAGNOSIS — E78.5 HYPERLIPIDEMIA, UNSPECIFIED: ICD-10-CM

## 2019-04-01 DIAGNOSIS — Z79.82 LONG TERM (CURRENT) USE OF ASPIRIN: ICD-10-CM

## 2019-04-01 DIAGNOSIS — I50.20 UNSPECIFIED SYSTOLIC (CONGESTIVE) HEART FAILURE: ICD-10-CM

## 2019-04-01 DIAGNOSIS — I42.9 CARDIOMYOPATHY, UNSPECIFIED: ICD-10-CM

## 2019-04-01 DIAGNOSIS — F17.210 NICOTINE DEPENDENCE, CIGARETTES, UNCOMPLICATED: ICD-10-CM

## 2019-04-01 DIAGNOSIS — I42.8 OTHER CARDIOMYOPATHIES: ICD-10-CM

## 2019-04-01 DIAGNOSIS — I11.0 HYPERTENSIVE HEART DISEASE WITH HEART FAILURE: ICD-10-CM

## 2019-04-08 ENCOUNTER — APPOINTMENT (OUTPATIENT)
Dept: HEART AND VASCULAR | Facility: CLINIC | Age: 49
End: 2019-04-08
Payer: MEDICAID

## 2019-04-08 VITALS
HEIGHT: 68.5 IN | BODY MASS INDEX: 26.82 KG/M2 | TEMPERATURE: 98.6 F | DIASTOLIC BLOOD PRESSURE: 60 MMHG | OXYGEN SATURATION: 99 % | HEART RATE: 84 BPM | WEIGHT: 178.99 LBS | SYSTOLIC BLOOD PRESSURE: 84 MMHG

## 2019-04-08 PROCEDURE — 36415 COLL VENOUS BLD VENIPUNCTURE: CPT

## 2019-04-08 PROCEDURE — 99214 OFFICE O/P EST MOD 30 MIN: CPT

## 2019-04-08 PROCEDURE — 93000 ELECTROCARDIOGRAM COMPLETE: CPT

## 2019-04-08 RX ORDER — FUROSEMIDE 20 MG/1
20 TABLET ORAL DAILY
Refills: 0 | Status: DISCONTINUED | COMMUNITY
Start: 2019-02-11 | End: 2019-04-08

## 2019-04-08 NOTE — HISTORY OF PRESENT ILLNESS
[FreeTextEntry1] : 47 yo M with idiopathic DCM, LVEF now 10% and mod-severe MR (6/21/16) returns today for a routine follow up. Repeat TTE on 6/12/17 revealed LVEF 15-20%, despite maximal HF GDMT. He is now s/p successful BiV ICD implant with Dr. Holly 3/28/19.\par \par Since seen two weeks ago, he feels great overall. He continues to have no limitations and works full time. However, he has had a very bothersome cough for 2 days now and reports chills last night. He did not check his temperature.  He denies any sick contacts or recent travel.  He denies CP, SOB at rest, orthopnea, PND, LE edema, abdominal discomfort, LH/dizziness, palpitations, and syncope. No change in bowel or bladder habits. He still tries to restrict his salt and fluid intake and he has not been hospitalized or to the ED since December.

## 2019-04-08 NOTE — DISCUSSION/SUMMARY
[FreeTextEntry1] : 47 yo M with NICM, EF now 10% who is now s/p BiV ICD 3/25 at [facility]. He is ACC/AHA Stage C, NYHA Class II HF, euvolemic on exam. I have recommended the following:\par \par Chronic Systolic HF- Stable and well compensated today. Check labs today. Given low BPs will DC Coreg and start Toprol XL 50mg daily. Continue Spironolactone. Will DC Lasix as he is euvolemic. Now s/p BiV ICD. Will continue ongoing uptitration of Toprol Q2 weeks.\par \par Hyperlipidemia- Lipitor 80mg daily.\par \par LBBB- s/p BiV ICD.\par \par Follow up in 2 weeks.

## 2019-04-08 NOTE — REASON FOR VISIT
[Follow-Up - Clinic] : a clinic follow-up of [Heart Failure] : congestive heart failure [FreeTextEntry1] : PATIENT INSTRUCTIONS:\par \par 1. Labs today.\par \par 2. Please STOP the Lasix. If you feel you are retaining fluid (if you gain 3 lbs in one day or 5lbs in one week) call Elsi and we will restart it. \par \par 3. Please STOP the CARVEDILOL (COREG) and START METOPROLOL 50mg daily. \par \par 4. Follow up in 2 weeks.

## 2019-04-09 ENCOUNTER — RX RENEWAL (OUTPATIENT)
Age: 49
End: 2019-04-09

## 2019-04-09 LAB
ANION GAP SERPL CALC-SCNC: 14 MMOL/L
BUN SERPL-MCNC: 21 MG/DL
CALCIUM SERPL-MCNC: 9.3 MG/DL
CHLORIDE SERPL-SCNC: 103 MMOL/L
CO2 SERPL-SCNC: 25 MMOL/L
CREAT SERPL-MCNC: 0.9 MG/DL
GLUCOSE SERPL-MCNC: 125 MG/DL
MAGNESIUM SERPL-MCNC: 1.9 MG/DL
NT-PROBNP SERPL-MCNC: 943 PG/ML
POTASSIUM SERPL-SCNC: 4.6 MMOL/L
SODIUM SERPL-SCNC: 142 MMOL/L

## 2019-04-09 RX ORDER — CARVEDILOL 6.25 MG/1
6.25 TABLET, FILM COATED ORAL
Qty: 60 | Refills: 1 | Status: DISCONTINUED | COMMUNITY
Start: 2019-02-11 | End: 2019-04-09

## 2019-04-10 ENCOUNTER — APPOINTMENT (OUTPATIENT)
Dept: HEART AND VASCULAR | Facility: CLINIC | Age: 49
End: 2019-04-10
Payer: MEDICAID

## 2019-04-10 VITALS
DIASTOLIC BLOOD PRESSURE: 52 MMHG | HEIGHT: 69 IN | HEART RATE: 70 BPM | WEIGHT: 178 LBS | SYSTOLIC BLOOD PRESSURE: 87 MMHG | BODY MASS INDEX: 26.36 KG/M2

## 2019-04-10 PROCEDURE — 93284 PRGRMG EVAL IMPLANTABLE DFB: CPT

## 2019-04-17 NOTE — DISCUSSION/SUMMARY
[Pacemaker Function Normal] : normal pacemaker function [FreeTextEntry1] : Mr. Cheema is a 48 year old M LBBB and non ischemic cardiomyopathy, on optimal heart failure medications s/p BiV 3/25/19, who presents for follow up.   Device incision is well healed.  Interrogation reveals normal function.  All measured data is within normal limits.  No events for review.  He is optimally Biv pacing.  He was educated that he needs to stay on all of his medications for his cardiomyopathy.  He should get a repeat echo in 2-3 months.  He will follow up in 6 months.  He knows to call with any questions or concerns.

## 2019-04-17 NOTE — PHYSICAL EXAM
[General Appearance - Well Developed] : well developed [Normal Appearance] : normal appearance [Well Groomed] : well groomed [General Appearance - Well Nourished] : well nourished [No Deformities] : no deformities [General Appearance - In No Acute Distress] : no acute distress [Heart Rate And Rhythm] : heart rate and rhythm were normal [Heart Sounds] : normal S1 and S2 [Edema] : no peripheral edema present [Respiration, Rhythm And Depth] : normal respiratory rhythm and effort [Exaggerated Use Of Accessory Muscles For Inspiration] : no accessory muscle use [Cyanosis, Localized] : no localized cyanosis [] : no ischemic changes [Normal Conjunctiva] : the conjunctiva exhibited no abnormalities [Normal Oropharynx] : normal oropharynx [Normal Jugular Venous V Waves Present] : normal jugular venous V waves present [Abnormal Walk] : normal gait [Gait - Sufficient For Exercise Testing] : the gait was sufficient for exercise testing [Skin Color & Pigmentation] : normal skin color and pigmentation [Oriented To Time, Place, And Person] : oriented to person, place, and time [Affect] : the affect was normal [Mood] : the mood was normal [No Anxiety] : not feeling anxious [Left Infraclavicular] : left infraclavicular area [Clean] : clean [Well-Healed] : well-healed [Dry] : dry [Palpable Crepitus] : no palpable crepitus [Bleeding] : no active bleeding [Foul Odor] : no foul smell [Purulent Drainage] : no purulent drainage [Serosanguineous Drainage] : no serosanquineous drainage [Warm] : not warm [Erythema] : not erythematous [Serous Drainage] : no serous drainage [Indurated] : not indurated [Tender] : not tender [Fluctuant] : not fluctuant

## 2019-04-17 NOTE — HISTORY OF PRESENT ILLNESS
[Palpitations] : no palpitations [Syncope] : no syncope [SOB] : no dyspnea [Dizziness] : no dizziness [Chest Pain] : no chest pain or discomfort [Shoulder Pain] : no shoulder pain [ICD Shocks] : no recent ICD shocks [Pain at Site] : no pain at device site [Erythema at Site] : no erythema at device site [Swelling at Site] : no swelling at device site [FreeTextEntry1] : Mr. Cheema is a 48 year old M LBBB and non ischemic cardiomyopathy, on optimal heart failure medications s/p BiV 3/25/19, who presents for follow up.\par \par  Mr. Cheema was diagnosed with systolic heart failure about 3 years ago.  He was found to have normal coronaries and over the past year has been uptitrated on optimal heart failure medication.  He was seen in our office and was offered a BiV in 2017 but felt that he was too young and healthy and deferred it.  He has been following up with heart failure and has been compliant with all medications.  Last echo 12/2018 EF 10%.  He however, experienced SOB in December and was hospitalized for a heart failure exacerbation.  He was diuresed and discharged and presented to our office and decided it was time to procedure with the device.  Since implant he has been doing well and notes more energy.  He has no device related complaints.  He denies any limitations in exercise tolerance and any BLEVINS, chest pain, palpitations, orthopnea, PND, syncope or near syncope.  \par  \par  \par \par Echo: 6/12/17 TTE: LVEDD 6.4 cm, LVEF 15-20%, Mild to mod MR,  LA mildly dilated, normal RV size and function, Trace TR.\par Echo 12/19 EF 10%\par \par Echo 6/18/16 TTE: LVEDD 6.7 cm, LVEF 15%, mod-severe MR, grade 4 diastolic dysfunction, normal RV size and function, mild TR.\par \par Cardiac Cath: 6/20/16  Ao 89/70/79. No coronary artery disease. RHC: RA 12, RV 39/13, PA 37/21/30, PCW 24, CO/CI (TD) 2.6/1.3, PVR 2.31, SVR 2063, PA 43.7%. \par

## 2019-04-17 NOTE — REVIEW OF SYSTEMS
[see HPI] : see HPI [Negative] : Heme/Lymph [Fever] : no fever [Chills] : no chills [Feeling Fatigued] : not feeling fatigued [Cough] : no cough [Wheezing] : no wheezing [Coughing Up Blood] : no hemoptysis

## 2019-04-17 NOTE — PROCEDURE
[No] : not [Threshold Testing Performed] : Threshold testing was performed [NSR] : normal sinus rhythm [CRT-D] : Cardiac resynchronization therapy defibrillator [DDD] : DDD [Sensing Amplitude ___mv] : sensing amplitude was [unfilled] mv [Lead Imp:  ___ohms] : lead impedance was [unfilled] ohms [None] : none [___ ms] : [unfilled] ms [___V @] : [unfilled] V [de-identified] : [facility]  [de-identified] : [employer]  [de-identified] : 3/25/19 [de-identified] : [id number] [de-identified] : 45/130 [de-identified] : 8 years [de-identified] : shock impedance 67 [de-identified] : AP <1%\par BIV paced 100%\par no events

## 2019-04-22 ENCOUNTER — APPOINTMENT (OUTPATIENT)
Dept: HEART AND VASCULAR | Facility: CLINIC | Age: 49
End: 2019-04-22

## 2019-07-10 PROCEDURE — 82550 ASSAY OF CK (CPK): CPT

## 2019-07-10 PROCEDURE — 93306 TTE W/DOPPLER COMPLETE: CPT

## 2019-07-10 PROCEDURE — G0378: CPT

## 2019-07-10 PROCEDURE — 93005 ELECTROCARDIOGRAM TRACING: CPT

## 2019-07-10 PROCEDURE — 84100 ASSAY OF PHOSPHORUS: CPT

## 2019-07-10 PROCEDURE — 82962 GLUCOSE BLOOD TEST: CPT

## 2019-07-10 PROCEDURE — 84484 ASSAY OF TROPONIN QUANT: CPT

## 2019-07-10 PROCEDURE — 80053 COMPREHEN METABOLIC PANEL: CPT

## 2019-07-10 PROCEDURE — 83880 ASSAY OF NATRIURETIC PEPTIDE: CPT

## 2019-07-10 PROCEDURE — 84443 ASSAY THYROID STIM HORMONE: CPT

## 2019-07-10 PROCEDURE — 82607 VITAMIN B-12: CPT

## 2019-07-10 PROCEDURE — 80061 LIPID PANEL: CPT

## 2019-07-10 PROCEDURE — 83036 HEMOGLOBIN GLYCOSYLATED A1C: CPT

## 2019-07-10 PROCEDURE — 99285 EMERGENCY DEPT VISIT HI MDM: CPT | Mod: 25

## 2019-07-10 PROCEDURE — 82553 CREATINE MB FRACTION: CPT

## 2019-07-10 PROCEDURE — 80048 BASIC METABOLIC PNL TOTAL CA: CPT

## 2019-07-10 PROCEDURE — 71045 X-RAY EXAM CHEST 1 VIEW: CPT

## 2019-07-10 PROCEDURE — 85027 COMPLETE CBC AUTOMATED: CPT

## 2019-07-10 PROCEDURE — 83735 ASSAY OF MAGNESIUM: CPT

## 2019-07-25 ENCOUNTER — APPOINTMENT (OUTPATIENT)
Dept: HEART AND VASCULAR | Facility: CLINIC | Age: 49
End: 2019-07-25
Payer: MEDICAID

## 2019-07-25 VITALS
SYSTOLIC BLOOD PRESSURE: 88 MMHG | BODY MASS INDEX: 28.14 KG/M2 | DIASTOLIC BLOOD PRESSURE: 50 MMHG | HEART RATE: 77 BPM | WEIGHT: 190 LBS | HEIGHT: 69 IN

## 2019-07-25 PROCEDURE — 93284 PRGRMG EVAL IMPLANTABLE DFB: CPT

## 2019-07-25 PROCEDURE — 99214 OFFICE O/P EST MOD 30 MIN: CPT | Mod: 25

## 2019-07-31 NOTE — PROCEDURE
[No] : not [NSR] : normal sinus rhythm [CRT-D] : Cardiac resynchronization therapy defibrillator [DDD] : DDD [Threshold Testing Performed] : Threshold testing was performed [Lead Imp:  ___ohms] : lead impedance was [unfilled] ohms [Sensing Amplitude ___mv] : sensing amplitude was [unfilled] mv [___V @] : [unfilled] V [___ ms] : [unfilled] ms [de-identified] : [facility]  [de-identified] : [employer]  [de-identified] : [id number] [de-identified] : 3/25/19 [de-identified] : 10 years [de-identified] : 45/130 [de-identified] : shock impedance 77 [de-identified] : AP <1%\par BIV paced 98%\par 2 episodes of SVT the device ATPed and eventually terminated

## 2019-07-31 NOTE — PHYSICAL EXAM
[General Appearance - Well Developed] : well developed [Normal Appearance] : normal appearance [Well Groomed] : well groomed [General Appearance - Well Nourished] : well nourished [No Deformities] : no deformities [General Appearance - In No Acute Distress] : no acute distress [Heart Rate And Rhythm] : heart rate and rhythm were normal [Heart Sounds] : normal S1 and S2 [Edema] : no peripheral edema present [Respiration, Rhythm And Depth] : normal respiratory rhythm and effort [Exaggerated Use Of Accessory Muscles For Inspiration] : no accessory muscle use [Left Infraclavicular] : left infraclavicular area [Clean] : clean [Dry] : dry [Well-Healed] : well-healed [] : no ischemic changes [Normal Conjunctiva] : the conjunctiva exhibited no abnormalities [Normal Oropharynx] : normal oropharynx [Normal Jugular Venous V Waves Present] : normal jugular venous V waves present [Abnormal Walk] : normal gait [Gait - Sufficient For Exercise Testing] : the gait was sufficient for exercise testing [Skin Color & Pigmentation] : normal skin color and pigmentation [Oriented To Time, Place, And Person] : oriented to person, place, and time [Affect] : the affect was normal [Mood] : the mood was normal [No Anxiety] : not feeling anxious [Impaired Insight] : insight and judgment were intact [Palpable Crepitus] : no palpable crepitus [Bleeding] : no active bleeding [Foul Odor] : no foul smell [Purulent Drainage] : no purulent drainage [Serosanguineous Drainage] : no serosanquineous drainage [Serous Drainage] : no serous drainage [Erythema] : not erythematous [Warm] : not warm [Tender] : not tender [Indurated] : not indurated [Fluctuant] : not fluctuant

## 2019-07-31 NOTE — REVIEW OF SYSTEMS
[see HPI] : see HPI [Negative] : Respiratory [Fever] : no fever [Chills] : no chills [Feeling Fatigued] : not feeling fatigued [Cough] : no cough

## 2019-07-31 NOTE — DISCUSSION/SUMMARY
[Pacemaker Function Normal] : normal pacemaker function [FreeTextEntry1] : Mr. Cheema is a 49 year old M LBBB and non ischemic cardiomyopathy, on optimal heart failure medications s/p BiV 3/25/19, who presents for follow up.   Interrogation reveals normal function.  All measured data is within normal limits.  He is optimally Biv pacing.  Events show SVT with rapid ventricular rates.  His beta blocker can not be uptitrated secondary to BP.   His device was reprogrammed to avoid shocks for VT.  These episodes were short; however if he has longer episodes we should consider an ablation.   He will follow up in 1-2 months.  He knows to call with any questions or concerns.

## 2019-09-23 ENCOUNTER — APPOINTMENT (OUTPATIENT)
Dept: HEART AND VASCULAR | Facility: CLINIC | Age: 49
End: 2019-09-23
Payer: MEDICAID

## 2019-09-23 VITALS
OXYGEN SATURATION: 99 % | SYSTOLIC BLOOD PRESSURE: 72 MMHG | DIASTOLIC BLOOD PRESSURE: 50 MMHG | HEART RATE: 80 BPM | WEIGHT: 187 LBS | BODY MASS INDEX: 27.7 KG/M2 | HEIGHT: 69 IN | TEMPERATURE: 98.2 F

## 2019-09-23 VITALS — SYSTOLIC BLOOD PRESSURE: 95 MMHG | DIASTOLIC BLOOD PRESSURE: 65 MMHG

## 2019-09-23 PROCEDURE — 36415 COLL VENOUS BLD VENIPUNCTURE: CPT

## 2019-09-23 PROCEDURE — 99214 OFFICE O/P EST MOD 30 MIN: CPT

## 2019-09-23 RX ORDER — SPIRONOLACTONE 25 MG/1
25 TABLET ORAL
Qty: 30 | Refills: 0 | Status: DISCONTINUED | COMMUNITY
Start: 2019-02-11 | End: 2019-09-23

## 2019-09-23 NOTE — PHYSICAL EXAM
[General Appearance - Well Developed] : well developed [Normal Appearance] : normal appearance [Well Groomed] : well groomed [General Appearance - Well Nourished] : well nourished [Eyelids - No Xanthelasma] : the eyelids demonstrated no xanthelasmas [No Oral Cyanosis] : no oral cyanosis [Respiration, Rhythm And Depth] : normal respiratory rhythm and effort [Heart Rate And Rhythm] : heart rate and rhythm were normal [Auscultation Breath Sounds / Voice Sounds] : lungs were clear to auscultation bilaterally [Heart Sounds] : normal S1 and S2 [Murmurs] : no murmurs present [Edema] : no peripheral edema present [2+] : left 2+ [Bowel Sounds] : normal bowel sounds [Abdomen Soft] : soft [Abdomen Tenderness] : non-tender [Abnormal Walk] : normal gait [Nail Clubbing] : no clubbing of the fingernails [Cyanosis, Localized] : no localized cyanosis [Skin Turgor] : normal skin turgor [] : no rash [No Venous Stasis] : no venous stasis [Impaired Insight] : insight and judgment were intact [Oriented To Time, Place, And Person] : oriented to person, place, and time [Affect] : the affect was normal [Mood] : the mood was normal [FreeTextEntry1] : JVP < 6 cm H2O, no HJR

## 2019-09-23 NOTE — DISCUSSION/SUMMARY
[FreeTextEntry1] : 50 yo M with NICM, EF now 10% who is now s/p BiV ICD 3/25 at [facility]. He is ACC/AHA Stage C, NYHA Class II HF, euvolemic on exam. I have recommended the following:\par \par Chronic Systolic HF- Stable and well compensated today. Check labs today. Will DC Spironolactone given he is no longer on Lasix and BP borderline. Increase Toprol XL to 75mg daily.  Will continue ongoing uptitration of Toprol Q2 weeks.\par \par Hyperlipidemia- Lipitor 80mg daily.\par \par LBBB- s/p BiV ICD.\par \par Follow up in with Dr. Escalera in 1 month.

## 2019-09-23 NOTE — HISTORY OF PRESENT ILLNESS
[FreeTextEntry1] : 50 yo M with idiopathic DCM, LVEF now 10% and mod-severe MR (6/21/16) returns today for a routine follow up. Repeat TTE on 6/12/17 revealed LVEF 15-20%, despite maximal HF GDMT. He is now s/p successful BiV ICD implant with Dr. Holly 3/28/19.\par \par Patient has not been seen in clinic since 4/2019. Since then, ICD transmission c/w SVT with rapid ventricular rates- the device thought VT and attempted ATP. Device was interrogated showing normal function. He was asymptomatic during this. He continues to exercise 6 days a week at the gym and when he is not at the gym he is very active walking around for his job. He has no limitations on flatground or stairs. He denies CP, SOB at rest, orthopnea, PND, LE edema, abdominal discomfort, LH/dizziness, palpitations, and syncope. No change in bowel or bladder habits. He still tries to restrict his salt and fluid intake and he has not been hospitalized or to the ED in the interim.

## 2019-09-23 NOTE — REASON FOR VISIT
[Follow-Up - Clinic] : a clinic follow-up of [Heart Failure] : congestive heart failure [FreeTextEntry1] : PATIENT INSTRUCTIONS:\par \par 1. Labs today.\par \par 2. Please STOP the SPIRONOLACTONE.\par \par 3. Please INCREASE the METOPROLOL to 75mg daily. You have 50mg tablets so that is 1.5 tablets daily.\par \par 4. Follow up with Dr. Escalera in 1 month. Call [phone] to make an appointment for her next available time slot.

## 2019-09-24 LAB — NT-PROBNP SERPL-MCNC: 211 PG/ML

## 2019-09-26 LAB
ANION GAP SERPL CALC-SCNC: 17 MMOL/L
BUN SERPL-MCNC: 13 MG/DL
CALCIUM SERPL-MCNC: 9.9 MG/DL
CHLORIDE SERPL-SCNC: 101 MMOL/L
CO2 SERPL-SCNC: 24 MMOL/L
CREAT SERPL-MCNC: 0.86 MG/DL
GLUCOSE SERPL-MCNC: 127 MG/DL
MAGNESIUM SERPL-MCNC: 2.1 MG/DL
POTASSIUM SERPL-SCNC: 4.8 MMOL/L
SODIUM SERPL-SCNC: 142 MMOL/L

## 2019-10-31 PROCEDURE — C1730: CPT

## 2019-10-31 PROCEDURE — C1877: CPT

## 2019-10-31 PROCEDURE — C1882: CPT

## 2019-10-31 PROCEDURE — C1769: CPT

## 2019-10-31 PROCEDURE — 71046 X-RAY EXAM CHEST 2 VIEWS: CPT

## 2019-10-31 PROCEDURE — C1892: CPT

## 2019-10-31 PROCEDURE — 93005 ELECTROCARDIOGRAM TRACING: CPT

## 2019-10-31 PROCEDURE — C1777: CPT

## 2019-10-31 PROCEDURE — C1898: CPT

## 2020-02-27 ENCOUNTER — APPOINTMENT (OUTPATIENT)
Dept: HEART AND VASCULAR | Facility: CLINIC | Age: 50
End: 2020-02-27
Payer: MEDICAID

## 2020-02-27 VITALS
WEIGHT: 195 LBS | SYSTOLIC BLOOD PRESSURE: 100 MMHG | HEIGHT: 69 IN | HEART RATE: 71 BPM | BODY MASS INDEX: 28.88 KG/M2 | DIASTOLIC BLOOD PRESSURE: 51 MMHG

## 2020-02-27 PROCEDURE — 99211 OFF/OP EST MAY X REQ PHY/QHP: CPT

## 2020-02-27 PROCEDURE — 93284 PRGRMG EVAL IMPLANTABLE DFB: CPT

## 2020-02-27 NOTE — PHYSICAL EXAM
[Normal Appearance] : normal appearance [General Appearance - Well Developed] : well developed [No Deformities] : no deformities [Well Groomed] : well groomed [General Appearance - Well Nourished] : well nourished [General Appearance - In No Acute Distress] : no acute distress [Heart Rate And Rhythm] : heart rate and rhythm were normal [Edema] : no peripheral edema present [Heart Sounds] : normal S1 and S2 [Exaggerated Use Of Accessory Muscles For Inspiration] : no accessory muscle use [Respiration, Rhythm And Depth] : normal respiratory rhythm and effort [Left Infraclavicular] : left infraclavicular area [Clean] : clean [Palpable Crepitus] : no palpable crepitus [Well-Healed] : well-healed [Dry] : dry [Foul Odor] : no foul smell [Bleeding] : no active bleeding [Purulent Drainage] : no purulent drainage [Serosanguineous Drainage] : no serosanquineous drainage [Warm] : not warm [Serous Drainage] : no serous drainage [Erythema] : not erythematous [Indurated] : not indurated [Tender] : not tender [] : no ischemic changes [Fluctuant] : not fluctuant [Normal Oropharynx] : normal oropharynx [Normal Conjunctiva] : the conjunctiva exhibited no abnormalities [Abnormal Walk] : normal gait [Normal Jugular Venous V Waves Present] : normal jugular venous V waves present [Skin Color & Pigmentation] : normal skin color and pigmentation [Gait - Sufficient For Exercise Testing] : the gait was sufficient for exercise testing [Impaired Insight] : insight and judgment were intact [Affect] : the affect was normal [Oriented To Time, Place, And Person] : oriented to person, place, and time [No Anxiety] : not feeling anxious [Mood] : the mood was normal

## 2020-02-27 NOTE — HISTORY OF PRESENT ILLNESS
[Syncope] : no syncope [Palpitations] : no palpitations [SOB] : no dyspnea [Dizziness] : no dizziness [Chest Pain] : no chest pain or discomfort [Shoulder Pain] : no shoulder pain [ICD Shocks] : no recent ICD shocks [Pain at Site] : no pain at device site [Swelling at Site] : no swelling at device site [Erythema at Site] : no erythema at device site [FreeTextEntry1] : Mr. Cheema is a 49 year old M LBBB and non ischemic cardiomyopathy, on optimal heart failure medications s/p BiV 3/25/19. \par Feels well, exercise tolerance has now significnat improved since BiV implant. He is class I heart failure symptoms.

## 2020-02-27 NOTE — PROCEDURE
[NSR] : normal sinus rhythm [No] : not [CRT-D] : Cardiac resynchronization therapy defibrillator [DDD] : DDD [Threshold Testing Performed] : Threshold testing was performed [Lead Imp:  ___ohms] : lead impedance was [unfilled] ohms [___V @] : [unfilled] V [Sensing Amplitude ___mv] : sensing amplitude was [unfilled] mv [de-identified] : [facility]  [___ ms] : [unfilled] ms [de-identified] : 3/25/19 [de-identified] : [employer]  [de-identified] : [id number] [de-identified] : 45/130 [de-identified] : 10 years

## 2020-02-27 NOTE — REVIEW OF SYSTEMS
[Fever] : no fever [Chills] : no chills [Feeling Fatigued] : not feeling fatigued [see HPI] : see HPI [Cough] : no cough [Negative] : Psychiatric

## 2020-02-27 NOTE — REASON FOR VISIT
[Follow-up Device Check] : follow-up device check visit [Cardiomyopathy] : cardiomyopathy [Follow-Up - Clinic] : a clinic follow-up of

## 2020-02-27 NOTE — DISCUSSION/SUMMARY
[Pacemaker Function Normal] : normal pacemaker function [FreeTextEntry1] : Normal device function, heart failure symptoms much improved now class I.

## 2020-03-03 ENCOUNTER — RX RENEWAL (OUTPATIENT)
Age: 50
End: 2020-03-03

## 2020-04-03 ENCOUNTER — RX RENEWAL (OUTPATIENT)
Age: 50
End: 2020-04-03

## 2020-07-14 NOTE — ED PROVIDER NOTE - TOBACCO USE
Patient needs appointment with Saida Perera. Request denied at this time.     Cherri Hernandes called requesting a refill of the below medication which has been pended for you:     Requested Prescriptions     Pending Prescriptions Disp Refills    fluticasone (FLONASE) 50 MCG/ACT nasal spray [Pharmacy Med Name: FLUTICASONE 50MCG NASAL SP (120) RX] 48 g 0     Sig: SHAKE LIQUID AND USE 1 SPRAY IN EACH NOSTRIL DAILY       Last Appointment Date: Visit date not found  Next Appointment Date: Visit date not found    Allergies   Allergen Reactions    Pcn [Penicillins] Rash    Sulfa Antibiotics Rash
Never smoker

## 2020-11-10 ENCOUNTER — NON-APPOINTMENT (OUTPATIENT)
Age: 50
End: 2020-11-10

## 2021-02-26 ENCOUNTER — EMERGENCY (EMERGENCY)
Facility: HOSPITAL | Age: 51
LOS: 1 days | Discharge: HOME | End: 2021-02-26
Attending: EMERGENCY MEDICINE
Payer: MEDICAID

## 2021-02-26 VITALS
RESPIRATION RATE: 16 BRPM | HEART RATE: 72 BPM | TEMPERATURE: 99 F | DIASTOLIC BLOOD PRESSURE: 76 MMHG | SYSTOLIC BLOOD PRESSURE: 125 MMHG | HEIGHT: 69 IN | WEIGHT: 209.44 LBS | OXYGEN SATURATION: 99 %

## 2021-02-26 PROCEDURE — 99283 EMERGENCY DEPT VISIT LOW MDM: CPT

## 2021-02-26 NOTE — ED PROVIDER NOTE - CLINICAL SUMMARY MEDICAL DECISION MAKING FREE TEXT BOX
As per history and physical exam, rash is most likely pityriasis rosacea. Triamcinolone cream and prednisone PO ordered for pruritis.

## 2021-02-26 NOTE — ED PROVIDER NOTE - NS ED ROS FT
Pruritic rash x 1 month mainly on upper torso and b/l arms.  As per patient, rash started on the right arm.

## 2021-02-26 NOTE — ED PROVIDER NOTE - PHYSICAL EXAMINATION
Maculopapular rash located on upper torso, back, b/l arms and neck. [place] patch noted on right upper arm. Rash without drainage or pus. B/l elbows and behind the knees observed without any rash.

## 2021-02-26 NOTE — ED PROVIDER NOTE - PATIENT PORTAL LINK FT
You can access the FollowMyHealth Patient Portal offered by [facility] by registering at the following website: [email or web address]. By joining [employer]’s FollowMyHealth portal, you will also be able to view your health information using other applications (apps) compatible with our system.

## 2021-02-26 NOTE — ED PROVIDER NOTE - OBJECTIVE STATEMENT
50 year old male presents to ED for generalized rash x 1 month. Rash is pruritic without discharge or pus. As per patient, rash started on the right arm. Denies fever, malaise, fatigue, joint pain, swelling, pain, recent travel, sick contacts or changes in lifestyle. Patient up to date on his vaccinations. No known allergies. Pmhx CHF NYHA class IV, chronic, combined    HTN (hypertension), Hypercholesterolemia and Renal stones.

## 2021-02-26 NOTE — ED PROVIDER NOTE - ATTENDING CONTRIBUTION TO CARE
I was physically present for the E/M service provided. I agree with above history, physical, and plan which I have reviewed and edited where appropriate. I was physically present for the key portions of the service provided.    Judy: 50 year old male presents to ED for generalized rash x 1 month. Rash is pruritic without discharge or pus. rash started at right arm. no fever, no pain, doesn't itch.    Maculopapular rash located on upper torso, back, b/l arms and neck that blanches. also larger appearing rash at right arm. sparing palms and soles  no oral lesions.    a/p: skin rash possibly pityriasis rosea. f/u with derm.

## 2021-03-08 ENCOUNTER — APPOINTMENT (OUTPATIENT)
Dept: HEART AND VASCULAR | Facility: CLINIC | Age: 51
End: 2021-03-08

## 2021-03-08 ENCOUNTER — NON-APPOINTMENT (OUTPATIENT)
Age: 51
End: 2021-03-08

## 2021-03-10 ENCOUNTER — NON-APPOINTMENT (OUTPATIENT)
Age: 51
End: 2021-03-10

## 2021-03-10 ENCOUNTER — APPOINTMENT (OUTPATIENT)
Dept: HEART AND VASCULAR | Facility: CLINIC | Age: 51
End: 2021-03-10
Payer: MEDICAID

## 2021-03-10 VITALS
SYSTOLIC BLOOD PRESSURE: 118 MMHG | WEIGHT: 210 LBS | DIASTOLIC BLOOD PRESSURE: 60 MMHG | TEMPERATURE: 97.9 F | HEIGHT: 69 IN | BODY MASS INDEX: 31.1 KG/M2 | HEART RATE: 83 BPM

## 2021-03-10 PROCEDURE — 99072 ADDL SUPL MATRL&STAF TM PHE: CPT

## 2021-03-10 PROCEDURE — 93284 PRGRMG EVAL IMPLANTABLE DFB: CPT

## 2021-03-10 PROCEDURE — 99212 OFFICE O/P EST SF 10 MIN: CPT | Mod: 25

## 2021-03-10 NOTE — HISTORY OF PRESENT ILLNESS
[Palpitations] : no palpitations [SOB] : no dyspnea [Syncope] : no syncope [Dizziness] : no dizziness [Chest Pain] : no chest pain or discomfort [ICD Shocks] : no recent ICD shocks [Shoulder Pain] : no shoulder pain [Pain at Site] : no pain at device site [Erythema at Site] : no erythema at device site [Swelling at Site] : no swelling at device site [FreeTextEntry1] : Mr. Cheema is a 50 year old male with a LBBB and non ischemic cardiomyopathy, on optimal heart failure medications s/p BiV 3/25/19. \par He presents for routine follow up and overall feels well with no complaints.  Stable exercise tolerance.  He had some AT on remote monitoring and was started on Metoprolol.  Stable exercise tolerance has now significant improved since BiV implant. He is class I heart failure symptoms.  No palpitations, syncope, near syncope, orthopnea, PND.  No device related issues.

## 2021-03-10 NOTE — DISCUSSION/SUMMARY
[AICD Function Normal] : normal AICD function [FreeTextEntry1] : Mr. Cheema is a 50 year old male with a LBBB and non ischemic cardiomyopathy, on optimal heart failure medications s/p BiV 3/25/19.   ICD interrogation reveals normal function and all measured data is within normal limits.  Events consistent with an atrial tachycardia at 150bpm.  Longest episode 6 minutes - overall heart rate trends ok.  He is asymptomatic from this and will increase his Metoprolol to 50mg BID.  He will continue remote monitoring and follow up in 1 year or sooner if needed.  He knows to call with any questions or concerns

## 2021-03-10 NOTE — PHYSICAL EXAM
[General Appearance - Well Developed] : well developed [Normal Appearance] : normal appearance [Well Groomed] : well groomed [General Appearance - Well Nourished] : well nourished [No Deformities] : no deformities [General Appearance - In No Acute Distress] : no acute distress [Heart Rate And Rhythm] : heart rate and rhythm were normal [Heart Sounds] : normal S1 and S2 [Edema] : no peripheral edema present [] : no respiratory distress [Respiration, Rhythm And Depth] : normal respiratory rhythm and effort [Exaggerated Use Of Accessory Muscles For Inspiration] : no accessory muscle use [Left Infraclavicular] : left infraclavicular area [Clean] : clean [Dry] : dry [Well-Healed] : well-healed [Palpable Crepitus] : no palpable crepitus [Bleeding] : no active bleeding [Foul Odor] : no foul smell [Purulent Drainage] : no purulent drainage [Serosanguineous Drainage] : no serosanquineous drainage [Serous Drainage] : no serous drainage [Erythema] : not erythematous [Warm] : not warm [Tender] : not tender [Indurated] : not indurated [Fluctuant] : not fluctuant [Normal Conjunctiva] : the conjunctiva exhibited no abnormalities [Normal Oropharynx] : normal oropharynx [Normal Jugular Venous V Waves Present] : normal jugular venous V waves present [Abnormal Walk] : normal gait [Gait - Sufficient For Exercise Testing] : the gait was sufficient for exercise testing [Skin Color & Pigmentation] : normal skin color and pigmentation [Oriented To Time, Place, And Person] : oriented to person, place, and time [Impaired Insight] : insight and judgment were intact [Affect] : the affect was normal [Mood] : the mood was normal [No Anxiety] : not feeling anxious

## 2021-03-10 NOTE — PROCEDURE
[No] : not [NSR] : normal sinus rhythm [CRT-D] : Cardiac resynchronization therapy defibrillator [DDD] : DDD [Threshold Testing Performed] : Threshold testing was performed [Lead Imp:  ___ohms] : lead impedance was [unfilled] ohms [Sensing Amplitude ___mv] : sensing amplitude was [unfilled] mv [___V @] : [unfilled] V [___ ms] : [unfilled] ms [None] : none [de-identified] : [facility]  [de-identified] : [employer]  [de-identified] : [id number] [de-identified] : 3/25/19 [de-identified] : 45/130 [de-identified] : 6.5 years [de-identified] : shock impedance 77\par AP 1%\par biV paced 97%

## 2021-03-10 NOTE — REVIEW OF SYSTEMS
[Fever] : no fever [Recent Weight Gain (___ Lbs)] : no recent weight gain [Chills] : no chills [Feeling Fatigued] : not feeling fatigued [Recent Weight Loss (___ Lbs)] : no recent weight loss [see HPI] : see HPI [Cough] : no cough [Negative] : Heme/Lymph

## 2021-03-24 ENCOUNTER — APPOINTMENT (OUTPATIENT)
Dept: HEART AND VASCULAR | Facility: CLINIC | Age: 51
End: 2021-03-24
Payer: MEDICAID

## 2021-03-24 ENCOUNTER — NON-APPOINTMENT (OUTPATIENT)
Age: 51
End: 2021-03-24

## 2021-03-24 PROCEDURE — 93295 DEV INTERROG REMOTE 1/2/MLT: CPT

## 2021-03-24 PROCEDURE — 93296 REM INTERROG EVL PM/IDS: CPT

## 2021-03-25 NOTE — DISCHARGE NOTE PROVIDER - NSDCQMSTAIRS_GEN_ALL_CORE
No Patient reports multiple joint pain and tenderness including fingers, wrist and knee joint   Inability to walk  On examination, it is tender to touch, however ,no swelling noted,   ROM limited   H/O Gout and takes Allopurinol and Dilaudid, Will continue   -Pt will likely benefit from ortho evaluation of the joints in the morning to rule out septic arthritis or gouty arthritis (unlikely in setting of multiple joint involvement at a time, also on Allopurinol)  PT consult

## 2021-03-30 NOTE — PATIENT PROFILE ADULT - NSPROSPIRITUALVALUESFT_GEN_A_NUR
Assessment:  1  Chronic pain syndrome    2  Chronic left-sided low back pain with left-sided sciatica    3  Lumbar disc herniation with radiculopathy        Plan:  Orders Placed This Encounter   Procedures    FL spine and pain procedure     4 week follow up after injection     Standing Status:   Future     Standing Expiration Date:   3/30/2025     Order Specific Question:   Reason for Exam:     Answer:   Left L4 and L5 transforaminal epidural steroid injection (35108 [id number])     Order Specific Question:   Is the patient pregnant? Answer:   No     Order Specific Question:   Anticoagulant hold needed? Answer:   unknown    Ambulatory referral to Neurosurgery     Standing Status:   Future     Standing Expiration Date:   3/30/2022     Referral Priority:   Routine     Referral Type:   Consult - AMB     Referral Reason:   Specialty Services Required     Referred to Provider:   Myron Waters MD     Requested Specialty:   Neurosurgery     Number of Visits Requested:   1     Expiration Date:   3/30/2022     My impressions and treatment recommendations were discussed in detail with the patient, who verbalized understanding and had no further questions  I had a long discussion with the patient  She has significant weakness in her left lower extremity and I mentioned to her that the  Interventional pain procedures that I perform only help with pain and will not improve her weakness  Patient is interested in a surgical evaluation, so I felt it reasonable to have the patient see Dr Myron Waters for an evaluation  In the interim, I did feel reasonable to offer the patient a left L4 and L5 transforaminal epidural steroid injection due to her lumbar disc herniation at this level and radicular symptoms in her left lower extremity  The procedures, its risks, and benefits were explained in detail to the patient   Risks include but are not limited to bleeding, infection, hematoma formation, abscess formation, weakness, headache, failure the pain to improve, nerve irritation or damage, and potential worsening of the pain  The patient verbalized understanding and wished to proceed with the procedure  Follow-up is planned in 4 weeks time or sooner as warranted  Discharge instructions were provided  I personally saw and examined the patient and I agree with the above discussed plan of care  History of Present Illness:    Grayson Cornejo  is a 38 y o  adult (male transitioning to female) who presents to [facility] for initial evaluation of the above stated pain complaints  The patient has a past medical and chronic pain history as outlined in the assessment section  She was referred by Dr Lane Arnold  The patient reports pain primarily in her low back and left lower extremity what appears to be the left L4 and L5 distributions  She describes her pain as moderate to severe  Her pain began in 2018 after a fall  Her pain is constant in nature  She reports that her pain bothers her in the morning, afternoon, evening, and night  She describes her pain as burning, cramping, shooting, numbness, sharp, pins and needles, pressure like, dull/aching  She reports weakness in her left lower extremity  She ambulates with the assistance of a cane  Lying down and relaxation decreases pain  Standing, bending, sitting, walking, and exercise increases pain  The patient does drink alcohol occasionally  She reports that physical therapy has not been helpful for her pain  Acetaminophen and aspirin do not provide her any relief of symptoms  Review of Systems:    Review of Systems   Constitutional: Negative for fever and unexpected weight change  HENT: Negative for trouble swallowing  Eyes: Negative for visual disturbance  Respiratory: Negative for shortness of breath and wheezing  Cardiovascular: Negative for chest pain and palpitations     Gastrointestinal: Negative for constipation, diarrhea, nausea and vomiting  Endocrine: Negative for cold intolerance, heat intolerance and polydipsia  Genitourinary: Negative for difficulty urinating and frequency  Musculoskeletal: Positive for arthralgias and myalgias  Negative for gait problem and joint swelling  Skin: Negative for rash  Neurological: Positive for numbness  Negative for dizziness, seizures, syncope, weakness and headaches  Hematological: Does not bruise/bleed easily  Psychiatric/Behavioral: Negative for dysphoric mood  All other systems reviewed and are negative  Patient Active Problem List   Diagnosis    History of sleeve gastrectomy    Essential hypertension    Cervical disc disease    Male-to-female transgender person    Anxiety    Umbilical hernia    GERD (gastroesophageal reflux disease)    Worsening headaches    Left-sided weakness and sensory deficits    Herniation of intervertebral disc of lumbar spine    Morbid obesity with BMI of 40 0-44 9, adult (HCC)    Chronic neck pain    Low back pain    Acute sinusitis    Chronic pain syndrome    Lumbar disc herniation with radiculopathy       Past Medical History:   Diagnosis Date    Anxiety     Asthma     seasonal    Hernia of abdominal wall     Hypertension     Kidney stones     Seizures ([address] )     pseudoseizures since 2012       Past Surgical History:   Procedure Laterality Date    APPENDECTOMY      CHOLECYSTECTOMY      GASTRIC BYPASS      HERNIA REPAIR      x2       Family History   Problem Relation Age of Onset    Hypertension Mother     Diabetes Mother     Hypertension Sister        Social History     Occupational History    Not on file   Tobacco Use    Smoking status: Never Smoker    Smokeless tobacco: Never Used   Substance and Sexual Activity    Alcohol use:  Yes     Alcohol/week: 3 0 standard drinks     Types: 3 Standard drinks or equivalent per week     Comment: 3 per month    Drug use: Never    Sexual activity: Not on file         Current Outpatient Medications:     albuterol (PROVENTIL HFA,VENTOLIN HFA) 90 mcg/act inhaler, Inhale 2 puffs every 6 (six) hours as needed for wheezing, Disp: 1 Inhaler, Rfl: 5    amLODIPine (NORVASC) 10 mg tablet, Take 1 tablet (10 mg total) by mouth daily, Disp: 90 tablet, Rfl: 1    aspirin (ECOTRIN LOW STRENGTH) 81 mg EC tablet, Take 81 mg by mouth daily, Disp: , Rfl:     celecoxib (CeleBREX) 200 mg capsule, celecoxib 200 mg capsule, Disp: , Rfl:     chlorthalidone 25 mg tablet, Take 1 tablet (25 mg total) by mouth daily, Disp: 90 tablet, Rfl: 0    Cyanocobalamin (B-12) 1000 MCG/ML KIT, Inject 1 ml IM in thigh/ buttocks or deltoid mm once weekly x 8 weeks  Afterward once monthly, Disp: 8 mL, Rfl: 1    diphenhydrAMINE (BENADRYL) 50 MG tablet, Take one tablet 1 hour prior to contrast administration  , Disp: 1 tablet, Rfl: 0    estradiol (ESTRACE) 2 MG tablet, Take 1 tablet (2 mg total) by mouth 2 (two) times a day, Disp: 180 tablet, Rfl: 1    hydrocortisone (ANUSOL-HC) 25 mg suppository, Insert 1 suppository (25 mg total) into the rectum 2 (two) times a day (Patient taking differently: Insert 25 mg into the rectum 2 (two) times a day prn), Disp: 60 suppository, Rfl: 1    LORazepam (ATIVAN) 1 mg tablet, Take 0 5 tablets (0 5 mg total) by mouth daily, Disp: 30 tablet, Rfl: 0    methocarbamol (ROBAXIN) 500 mg tablet, Take 1 tablet (500 mg total) by mouth daily after breakfast, Disp: 90 tablet, Rfl: 1    methocarbamol (ROBAXIN) 750 mg tablet, TAKE ONE TABLET BY MOUTH 3 TIMES DAILY AS NEEDED FOR MUSCLE SPASMS, Disp: 90 tablet, Rfl: 0    methocarbamol (ROBAXIN) 750 mg tablet, Take 1 tablet (750 mg total) by mouth daily at bedtime, Disp: 90 tablet, Rfl: 1    montelukast (SINGULAIR) 10 mg tablet, Take 1 tablet (10 mg total) by mouth daily at bedtime, Disp: 90 tablet, Rfl: 1    multivitamin (THERAGRAN) TABS, Take 1 tablet by mouth daily, Disp: , Rfl:     ondansetron (ZOFRAN-ODT) 4 mg disintegrating tablet, Take 1 tablet (4 mg total) by mouth every 8 (eight) hours as needed for nausea or vomiting, Disp: 60 tablet, Rfl: 1    pantoprazole (PROTONIX) 40 mg tablet, TAKE ONE TABLET BY MOUTH DAILY, Disp: 90 tablet, Rfl: 0    Syringe/Needle, Disp, (SYRINGE 3CC/[id number]/4") 27G X 1-1/4" 3 ML MISC, Use for B12 injections, Disp: 10 each, Rfl: 1    traMADol (ULTRAM) 50 mg tablet, TAKE ONE TABLET BY MOUTH ONCE DAILY AS NEEDED FOR MIGRAINES OR NECK SPASMS, Disp: 30 tablet, Rfl: 0    cyclobenzaprine (FLEXERIL) 10 mg tablet, Take 1 tablet (10 mg total) by mouth daily as needed for muscle spasms for up to 30 days, Disp: 30 tablet, Rfl: 1    gabapentin (NEURONTIN) 100 mg capsule, Take 3 capsules (300 mg total) by mouth daily (Patient not taking: Reported on 3/30/2021), Disp: 90 capsule, Rfl: 3    lidocaine (LIDODERM) 5 %, Apply 1 patch topically daily for 5 days Remove & Discard patch within 12 hours or as directed by MD, Disp: 5 patch, Rfl: 0    Allergies   Allergen Reactions    Gadolinium Anaphylaxis     "Oxygen dropped and coded during"       Iodinated Diagnostic Agents Anaphylaxis       Physical Exam:    /94   Pulse 84   Resp 16   Ht 5' 9" (1 753 m)   Wt (!) 140 kg (308 lb)   BMI 45 48 kg/m²     Constitutional: obese  Eyes: anicteric  HEENT: grossly intact  Neck: supple, symmetric, trachea midline and no masses   Pulmonary:even and unlabored  Cardiovascular:No edema or pitting edema present  Skin:Normal without rashes or lesions and well hydrated  Psychiatric:Mood and affect appropriate  Neurologic:Cranial Nerves II-XII grossly intact  Musculoskeletal:antalgic and ambulates with cane     Lumbar Spine Exam    Appearance:  Normal lordosis  Palpation/Tenderness:  no tenderness or spasm  Sensory:  no sensory deficits noted  Range of Motion:  Flexion:   Moderately limited  with pain  Extension:  Moderately limited  with pain  Lateral Flexion - Left:  Moderately limited  with pain  Lateral Flexion - Right:  Moderately limited  with pain  Rotation - Left:  Moderately limited  with pain  Rotation - Right:  Moderately limited  with pain     Lumbar facet loading is negative bilaterally  Motor Strength:  Left hip flexion:  2/5  Left hip extension:  2/5  Right hip flexion:  5/5  Right hip extension:  5/5  Left knee flexion:  2/5  Left knee extension:  2/5  Right knee flexion:  5/5  Right knee extension:  5/5  Left foot dorsiflexion:  2/5  Left foot plantar flexion:  2/5  Right foot dorsiflexion:  5/5  Right foot plantar flexion:  5/5  Reflexes:  Left Patellar:  absent   Right Patellar:  absent   Left Achilles:  1+   Right Achilles:  1+   Special Tests:  Left Straight Leg Test:  positive  Right Straight Leg Test:  negative  Left Endy's Maneuver:  negative  Right Endy's Maneuver:  negative    Imaging    CT LUMBAR SPINE     INDICATION:   Back pain, progressive neurologic deficit  left lumbar radiculopathy, left leg numbness/tingling      COMPARISON: 2/17/2020     TECHNIQUE:  Contiguous axial images through the lumbar spine were obtained  Sagittal and coronal reconstructions were performed        Radiation dose length product (DLP) for this visit:  1816 mGy-cm   This examination, like all CT scans performed [facility], was performed utilizing techniques to minimize radiation dose exposure, including the use of iterative   reconstruction and automated exposure control        IMAGE QUALITY:  Diagnostic      FINDINGS:     ALIGNMENT:  There are 5 lumbar type vertebral bodies  Normal alignment of the lumbar spine  No spondylolisthesis or spondylolysis      VERTEBRAL BODIES:  No fracture  No lytic or blastic lesion      DEGENERATIVE CHANGES:     Lower Thoracic spine:  Normal lower thoracic disc spaces  ]     L1-2:  Normal disc height  No herniation  Normal facet joints    No canal or foraminal stenosis      L2-3:  Minor bulging again seen with minimal facet involvement      L3-4:  Mild loss of disc height is noted  Left foraminal extrusion is again seen left foramina stenosis again noted  Appearance is similar to prior exam      L4-5:  Decreased disc height and mild bilateral facet arthropathy is present  Mild diffuse disc bulge is present  Minimal bilateral foramina stenosis again noted      L5-S1:  Mild facet arthropathy is present      PARASPINAL SOFT TISSUES:   Normal      IMPRESSION:     No acute abnormality identified  Stable left L3-4 foraminal extrusion, correlate for L3 radiculopathy  MRI LUMBAR SPINE WITHOUT CONTRAST     INDICATION: paresthesia  Left-sided weakness     COMPARISON:  2/17/2020     TECHNIQUE:  Sagittal T1, sagittal T2, sagittal inversion recovery, axial T1 and axial T2, coronal T2     IMAGE QUALITY:  Diagnostic     FINDINGS:     VERTEBRAL BODIES:  There are 5 lumbar type vertebral bodies  Scattered degenerative endplate changes  Diffusely hypointense marrow on T1 and T2-weighted images without focally suspicious marrow lesions suggesting red marrow conversion  Small   hemangioma in the S1 vertebra noted  No bone marrow edema or compression abnormality  Normal marrow signal is identified within the visualized bony structures  No discrete marrow lesion      SACRUM:  Small hemangioma in the S1 vertebra noted      DISTAL CORD AND CONUS:  Normal size and signal within the distal cord and conus  Conus medullaris terminates at the L1 level      PARASPINAL SOFT TISSUES:  Paraspinal soft tissues are unremarkable      LOWER THORACIC DISC SPACES:  Normal disc height and signal   No disc herniation, canal stenosis or foraminal narrowing      LUMBAR DISC SPACES:     L1-L2:  Normal      L2-L3:  Normal      L3-L4:  There is a left neural foraminal disc herniation, protrusion type  There is severe left neural foraminal narrowing  Central canal and right neural foramen patent      L4-L5:  There is a central/left paracentral disc herniation, extrusion type    Herniated disc material extends cephalad along the dorsal margin of the L4 vertebra in the midline, seen on image 17, series 6, correlating to finding on image 7, series 3  Mild central canal narrowing  Moderate left neural foraminal narrowing  Mild right neural foraminal narrowing      L5-S1:  There is a diffuse disk bulge  No significant central canal or neural foraminal narrowing      IMPRESSION:        1  Large left neural foraminal disc protrusion at L3-4  Spine surgical assessment recommended  2   Central disc herniation extrusion type extending slightly towards the left L4-5   3   Hypointense marrow may be related to red marrow conversion  Does the patient have anemia? Other etiologies include smoking, living at high altitudes and other infiltrative marrow processes including myelofibrosis or other infiltrative/neoplastic   marrow processes, although discrete focally suspicious marrow lesion is identified    Further clinical assessment recommended         FL spine and pain procedure    (Results Pending)       Orders Placed This Encounter   Procedures    FL spine and pain procedure    Ambulatory referral to Neurosurgery [facility]

## 2021-04-08 ENCOUNTER — APPOINTMENT (OUTPATIENT)
Dept: HEART AND VASCULAR | Facility: CLINIC | Age: 51
End: 2021-04-08
Payer: MEDICAID

## 2021-04-08 PROCEDURE — 93297 REM INTERROG DEV EVAL ICPMS: CPT

## 2021-04-08 PROCEDURE — G2066: CPT

## 2021-04-15 ENCOUNTER — APPOINTMENT (OUTPATIENT)
Dept: HEART AND VASCULAR | Facility: CLINIC | Age: 51
End: 2021-04-15
Payer: MEDICAID

## 2021-04-15 ENCOUNTER — NON-APPOINTMENT (OUTPATIENT)
Age: 51
End: 2021-04-15

## 2021-04-15 PROCEDURE — G2066: CPT

## 2021-04-15 PROCEDURE — 93297 REM INTERROG DEV EVAL ICPMS: CPT

## 2021-05-21 ENCOUNTER — APPOINTMENT (OUTPATIENT)
Dept: HEART AND VASCULAR | Facility: CLINIC | Age: 51
End: 2021-05-21
Payer: MEDICAID

## 2021-05-21 ENCOUNTER — NON-APPOINTMENT (OUTPATIENT)
Age: 51
End: 2021-05-21

## 2021-05-21 PROCEDURE — 93297 REM INTERROG DEV EVAL ICPMS: CPT

## 2021-05-21 PROCEDURE — G2066: CPT

## 2021-05-27 ENCOUNTER — NON-APPOINTMENT (OUTPATIENT)
Age: 51
End: 2021-05-27

## 2021-06-25 ENCOUNTER — NON-APPOINTMENT (OUTPATIENT)
Age: 51
End: 2021-06-25

## 2021-06-25 ENCOUNTER — APPOINTMENT (OUTPATIENT)
Dept: HEART AND VASCULAR | Facility: CLINIC | Age: 51
End: 2021-06-25
Payer: MEDICAID

## 2021-06-25 PROCEDURE — 93297 REM INTERROG DEV EVAL ICPMS: CPT

## 2021-06-25 PROCEDURE — G2066: CPT

## 2021-07-16 ENCOUNTER — NON-APPOINTMENT (OUTPATIENT)
Age: 51
End: 2021-07-16

## 2021-07-30 ENCOUNTER — NON-APPOINTMENT (OUTPATIENT)
Age: 51
End: 2021-07-30

## 2021-07-30 ENCOUNTER — APPOINTMENT (OUTPATIENT)
Dept: HEART AND VASCULAR | Facility: CLINIC | Age: 51
End: 2021-07-30
Payer: MEDICAID

## 2021-07-30 PROCEDURE — 93296 REM INTERROG EVL PM/IDS: CPT

## 2021-07-30 PROCEDURE — 93297 REM INTERROG DEV EVAL ICPMS: CPT

## 2021-07-30 PROCEDURE — G2066: CPT

## 2021-07-30 PROCEDURE — 93295 DEV INTERROG REMOTE 1/2/MLT: CPT

## 2021-08-09 ENCOUNTER — NON-APPOINTMENT (OUTPATIENT)
Age: 51
End: 2021-08-09

## 2021-09-03 ENCOUNTER — NON-APPOINTMENT (OUTPATIENT)
Age: 51
End: 2021-09-03

## 2021-09-03 ENCOUNTER — APPOINTMENT (OUTPATIENT)
Dept: HEART AND VASCULAR | Facility: CLINIC | Age: 51
End: 2021-09-03
Payer: MEDICAID

## 2021-09-03 PROCEDURE — 93297 REM INTERROG DEV EVAL ICPMS: CPT

## 2021-09-03 PROCEDURE — G2066: CPT

## 2021-09-14 ENCOUNTER — NON-APPOINTMENT (OUTPATIENT)
Age: 51
End: 2021-09-14

## 2021-09-14 ENCOUNTER — APPOINTMENT (OUTPATIENT)
Dept: HEART AND VASCULAR | Facility: CLINIC | Age: 51
End: 2021-09-14
Payer: MEDICAID

## 2021-09-14 VITALS
HEART RATE: 103 BPM | WEIGHT: 207 LBS | OXYGEN SATURATION: 97 % | HEIGHT: 69 IN | DIASTOLIC BLOOD PRESSURE: 69 MMHG | SYSTOLIC BLOOD PRESSURE: 109 MMHG | BODY MASS INDEX: 30.66 KG/M2

## 2021-09-14 DIAGNOSIS — Z95.810 PRESENCE OF AUTOMATIC (IMPLANTABLE) CARDIAC DEFIBRILLATOR: ICD-10-CM

## 2021-09-14 DIAGNOSIS — I44.7 LEFT BUNDLE-BRANCH BLOCK, UNSPECIFIED: ICD-10-CM

## 2021-09-14 PROCEDURE — 99214 OFFICE O/P EST MOD 30 MIN: CPT | Mod: 25

## 2021-09-14 PROCEDURE — 36415 COLL VENOUS BLD VENIPUNCTURE: CPT

## 2021-09-14 PROCEDURE — 93000 ELECTROCARDIOGRAM COMPLETE: CPT

## 2021-09-14 NOTE — HISTORY OF PRESENT ILLNESS
[FreeTextEntry1] : 50 yo M with idiopathic DCM, LVEF now 10% and mod-severe MR (6/21/16) returns today for a routine follow up. Repeat TTE on 6/12/17 revealed LVEF 15-20%, despite maximal HF GDMT. He is now s/p successful BiV ICD implant with Dr. Holly 3/28/19.\par \par Patient has not been seen in 2 years (since 9/2019). Since then, he reports feeling well and taking his medications every day. He has had episodes of AT as per EP records but was asymptomatic during these times. He continues to exercise and stay active. He drinks red wine several times a week as he was under the impression it's "good for your heart." He has no limitations on flatground or stairs. He denies CP, SOB at rest, orthopnea, PND, LE edema, abdominal discomfort, LH/dizziness, palpitations, and syncope. No change in bowel or bladder habits. He still tries to restrict his salt and fluid intake and he has not been hospitalized or to the ED in the interim.

## 2021-09-14 NOTE — REASON FOR VISIT
[Follow-Up - Clinic] : a clinic follow-up of [Heart Failure] : congestive heart failure [FreeTextEntry1] : PATIENT INSTRUCTIONS:\par \par 1. Labs today.\par \par 2. Please INCREASE the METOPROLOL to 50mg in the AM and 50mg in the PM. \par \par 3. We will schedule you for an ECHOCARDIOGRAM. \par \par 4. Follow up in 2 weeks.

## 2021-09-14 NOTE — DISCUSSION/SUMMARY
[FreeTextEntry1] : 50 yo M with NICM, EF now 10% who is now s/p BiV ICD 3/25 at [facility]. He is ACC/AHA Stage C, NYHA Class II HF, euvolemic on exam. I have recommended the following:\par \par Chronic Systolic HF- Stable and well compensated today, however, is not in touch with seriousness of heart condition and only reports to appointments every 2 years when he needs refills. Long discussion.  Check labs today. No TTE since 2018- will arrange. Increase Toprol XL to 50mg BID. \par \par Hyperlipidemia- Lipitor 80mg daily.\par \par LBBB- s/p BiV ICD.\par \par Follow up in 2 weeks with ECHO.

## 2021-09-15 LAB
ANION GAP SERPL CALC-SCNC: 15 MMOL/L
BUN SERPL-MCNC: 12 MG/DL
CALCIUM SERPL-MCNC: 9.6 MG/DL
CHLORIDE SERPL-SCNC: 102 MMOL/L
CO2 SERPL-SCNC: 24 MMOL/L
CREAT SERPL-MCNC: 0.88 MG/DL
GLUCOSE SERPL-MCNC: 103 MG/DL
NT-PROBNP SERPL-MCNC: 35 PG/ML
POTASSIUM SERPL-SCNC: 4.5 MMOL/L
SODIUM SERPL-SCNC: 141 MMOL/L

## 2021-09-22 ENCOUNTER — NON-APPOINTMENT (OUTPATIENT)
Age: 51
End: 2021-09-22

## 2021-09-22 ENCOUNTER — APPOINTMENT (OUTPATIENT)
Dept: HEART AND VASCULAR | Facility: CLINIC | Age: 51
End: 2021-09-22
Payer: MEDICAID

## 2021-09-22 VITALS
WEIGHT: 207 LBS | HEIGHT: 69 IN | HEART RATE: 78 BPM | DIASTOLIC BLOOD PRESSURE: 53 MMHG | SYSTOLIC BLOOD PRESSURE: 107 MMHG | BODY MASS INDEX: 30.66 KG/M2

## 2021-09-22 PROCEDURE — 93284 PRGRMG EVAL IMPLANTABLE DFB: CPT

## 2021-09-22 PROCEDURE — 99212 OFFICE O/P EST SF 10 MIN: CPT | Mod: 25

## 2021-09-28 NOTE — REASON FOR VISIT
[Follow-Up - Clinic] : a clinic follow-up of [Cardiomyopathy] : cardiomyopathy [Follow-up Device Check] : is here today for a follow-up device check visit for

## 2021-09-28 NOTE — HISTORY OF PRESENT ILLNESS
[Palpitations] : no palpitations [SOB] : no dyspnea [Syncope] : no syncope [Dizziness] : no dizziness [Chest Pain] : no chest pain or discomfort [ICD Shocks] : no recent ICD shocks [Shoulder Pain] : no shoulder pain [Pain at Site] : no pain at device site [Erythema at Site] : no erythema at device site [Swelling at Site] : no swelling at device site [FreeTextEntry1] : Mr. Cheema is a 51 year old male with a LBBB and non ischemic cardiomyopathy, on optimal heart failure medications s/p BiV 3/25/19. \par He presents for routine follow up and overall feels well with no complaints.  Stable exercise tolerance.  He had some AT on remote monitoring and was started on Metoprolol.  No palpitations, syncope, near syncope, orthopnea, PND.  No device related issues.

## 2021-09-28 NOTE — PROCEDURE
[No] : not [NSR] : normal sinus rhythm [CRT-D] : Cardiac resynchronization therapy defibrillator [Threshold Testing Performed] : Threshold testing was performed [DDD] : DDD [Lead Imp:  ___ohms] : lead impedance was [unfilled] ohms [Sensing Amplitude ___mv] : sensing amplitude was [unfilled] mv [___V @] : [unfilled] V [___ ms] : [unfilled] ms [None] : none [de-identified] : [facility]  [de-identified] : [employer]  [de-identified] : [id number] [de-identified] : 3/25/19 [de-identified] : 45/130 [de-identified] : 6.5 years [de-identified] : shock impedance 77\par AP 1%\par biV paced 97%\par short bursts of AT

## 2021-09-28 NOTE — PHYSICAL EXAM
[General Appearance - Well Developed] : well developed [Normal Appearance] : normal appearance [Well Groomed] : well groomed [General Appearance - Well Nourished] : well nourished [No Deformities] : no deformities [General Appearance - In No Acute Distress] : no acute distress [Heart Rate And Rhythm] : heart rate and rhythm were normal [Heart Sounds] : normal S1 and S2 [Edema] : no peripheral edema present [Respiration, Rhythm And Depth] : normal respiratory rhythm and effort [Exaggerated Use Of Accessory Muscles For Inspiration] : no accessory muscle use [Left Infraclavicular] : left infraclavicular area [Clean] : clean [Dry] : dry [Well-Healed] : well-healed [Normal Conjunctiva] : the conjunctiva exhibited no abnormalities [Normal Jugular Venous V Waves Present] : normal jugular venous V waves present [Normal Oropharynx] : normal oropharynx [Abnormal Walk] : normal gait [Gait - Sufficient For Exercise Testing] : the gait was sufficient for exercise testing [Skin Color & Pigmentation] : normal skin color and pigmentation [Oriented To Time, Place, And Person] : oriented to person, place, and time [Impaired Insight] : insight and judgment were intact [Affect] : the affect was normal [Mood] : the mood was normal [No Anxiety] : not feeling anxious [Palpable Crepitus] : no palpable crepitus [Bleeding] : no active bleeding [Foul Odor] : no foul smell [Purulent Drainage] : no purulent drainage [Serosanguineous Drainage] : no serosanquineous drainage [Serous Drainage] : no serous drainage [Erythema] : not erythematous [Warm] : not warm [Tender] : not tender [Indurated] : not indurated [Fluctuant] : not fluctuant [] : no rash

## 2021-09-28 NOTE — REVIEW OF SYSTEMS
[Fever] : no fever [Chills] : no chills [Feeling Fatigued] : not feeling fatigued [SOB] : no shortness of breath [Dyspnea on exertion] : not dyspnea during exertion [Palpitations] : no palpitations [Syncope] : no syncope [Cough] : no cough [Dizziness] : no dizziness [Negative] : Heme/Lymph

## 2021-09-28 NOTE — DISCUSSION/SUMMARY
[AICD Function Normal] : normal AICD function [FreeTextEntry1] : Mr. Cheema is a 51 year old male with a LBBB and non ischemic cardiomyopathy, on optimal heart failure medications s/p BiV 3/25/19.   ICD interrogation reveals normal function and all measured data is within normal limits.  Events consistent with an atrial tachycardia at 150bpm - all episodes <90 seconds.    He is asymptomatic from this and on Metoprolol.  He will continue remote monitoring and follow up in 1 year or sooner if needed.  He knows to call with any questions or concerns

## 2021-10-19 ENCOUNTER — FORM ENCOUNTER (OUTPATIENT)
Age: 51
End: 2021-10-19

## 2021-10-20 ENCOUNTER — OUTPATIENT (OUTPATIENT)
Dept: OUTPATIENT SERVICES | Facility: HOSPITAL | Age: 51
LOS: 1 days | End: 2021-10-20
Payer: MEDICAID

## 2021-10-20 DIAGNOSIS — I50.9 HEART FAILURE, UNSPECIFIED: ICD-10-CM

## 2021-10-20 PROCEDURE — 93306 TTE W/DOPPLER COMPLETE: CPT | Mod: 26

## 2021-10-20 PROCEDURE — C8929: CPT

## 2021-10-25 ENCOUNTER — NON-APPOINTMENT (OUTPATIENT)
Age: 51
End: 2021-10-25

## 2021-10-25 ENCOUNTER — APPOINTMENT (OUTPATIENT)
Dept: HEART AND VASCULAR | Facility: CLINIC | Age: 51
End: 2021-10-25
Payer: MEDICAID

## 2021-10-25 PROCEDURE — G2066: CPT

## 2021-10-25 PROCEDURE — 93297 REM INTERROG DEV EVAL ICPMS: CPT

## 2021-11-26 ENCOUNTER — NON-APPOINTMENT (OUTPATIENT)
Age: 51
End: 2021-11-26

## 2021-11-26 ENCOUNTER — APPOINTMENT (OUTPATIENT)
Dept: HEART AND VASCULAR | Facility: CLINIC | Age: 51
End: 2021-11-26
Payer: MEDICAID

## 2021-11-26 PROCEDURE — 93297 REM INTERROG DEV EVAL ICPMS: CPT

## 2021-11-26 PROCEDURE — G2066: CPT

## 2021-12-22 ENCOUNTER — NON-APPOINTMENT (OUTPATIENT)
Age: 51
End: 2021-12-22

## 2021-12-22 ENCOUNTER — APPOINTMENT (OUTPATIENT)
Dept: HEART AND VASCULAR | Facility: CLINIC | Age: 51
End: 2021-12-22
Payer: MEDICAID

## 2021-12-22 PROCEDURE — 93296 REM INTERROG EVL PM/IDS: CPT | Mod: NC

## 2021-12-22 PROCEDURE — 93295 DEV INTERROG REMOTE 1/2/MLT: CPT | Mod: NC

## 2022-01-04 ENCOUNTER — NON-APPOINTMENT (OUTPATIENT)
Age: 52
End: 2022-01-04

## 2022-01-04 ENCOUNTER — APPOINTMENT (OUTPATIENT)
Dept: HEART AND VASCULAR | Facility: CLINIC | Age: 52
End: 2022-01-04
Payer: MEDICAID

## 2022-01-04 PROCEDURE — 93297 REM INTERROG DEV EVAL ICPMS: CPT

## 2022-01-04 PROCEDURE — G2066: CPT

## 2022-02-02 ENCOUNTER — APPOINTMENT (OUTPATIENT)
Dept: HEART AND VASCULAR | Facility: CLINIC | Age: 52
End: 2022-02-02

## 2022-02-08 ENCOUNTER — NON-APPOINTMENT (OUTPATIENT)
Age: 52
End: 2022-02-08

## 2022-02-08 ENCOUNTER — APPOINTMENT (OUTPATIENT)
Dept: HEART AND VASCULAR | Facility: CLINIC | Age: 52
End: 2022-02-08
Payer: MEDICAID

## 2022-02-08 PROCEDURE — 93297 REM INTERROG DEV EVAL ICPMS: CPT

## 2022-02-08 PROCEDURE — G2066: CPT

## 2022-03-15 ENCOUNTER — APPOINTMENT (OUTPATIENT)
Dept: HEART AND VASCULAR | Facility: CLINIC | Age: 52
End: 2022-03-15
Payer: MEDICAID

## 2022-03-15 ENCOUNTER — NON-APPOINTMENT (OUTPATIENT)
Age: 52
End: 2022-03-15

## 2022-03-15 PROCEDURE — G2066: CPT

## 2022-03-15 PROCEDURE — 93297 REM INTERROG DEV EVAL ICPMS: CPT

## 2022-04-19 ENCOUNTER — NON-APPOINTMENT (OUTPATIENT)
Age: 52
End: 2022-04-19

## 2022-04-19 ENCOUNTER — APPOINTMENT (OUTPATIENT)
Dept: HEART AND VASCULAR | Facility: CLINIC | Age: 52
End: 2022-04-19
Payer: MEDICAID

## 2022-04-19 PROCEDURE — 93297 REM INTERROG DEV EVAL ICPMS: CPT

## 2022-04-19 PROCEDURE — G2066: CPT

## 2022-05-04 ENCOUNTER — NON-APPOINTMENT (OUTPATIENT)
Age: 52
End: 2022-05-04

## 2022-05-04 ENCOUNTER — APPOINTMENT (OUTPATIENT)
Dept: HEART AND VASCULAR | Facility: CLINIC | Age: 52
End: 2022-05-04
Payer: MEDICAID

## 2022-05-04 PROCEDURE — 93295 DEV INTERROG REMOTE 1/2/MLT: CPT

## 2022-05-04 PROCEDURE — 93296 REM INTERROG EVL PM/IDS: CPT

## 2022-05-24 ENCOUNTER — APPOINTMENT (OUTPATIENT)
Dept: HEART AND VASCULAR | Facility: CLINIC | Age: 52
End: 2022-05-24
Payer: MEDICAID

## 2022-05-24 ENCOUNTER — NON-APPOINTMENT (OUTPATIENT)
Age: 52
End: 2022-05-24

## 2022-05-24 PROCEDURE — G2066: CPT

## 2022-05-24 PROCEDURE — 93297 REM INTERROG DEV EVAL ICPMS: CPT

## 2022-06-21 ENCOUNTER — INPATIENT (INPATIENT)
Facility: HOSPITAL | Age: 52
LOS: 0 days | Discharge: HOME | DRG: 292 | End: 2022-06-22
Attending: INTERNAL MEDICINE | Admitting: INTERNAL MEDICINE
Payer: MEDICAID

## 2022-06-21 VITALS
HEART RATE: 68 BPM | SYSTOLIC BLOOD PRESSURE: 150 MMHG | DIASTOLIC BLOOD PRESSURE: 93 MMHG | OXYGEN SATURATION: 100 % | TEMPERATURE: 98 F | WEIGHT: 199.96 LBS | HEIGHT: 69 IN | RESPIRATION RATE: 18 BRPM

## 2022-06-21 DIAGNOSIS — R07.9 CHEST PAIN, UNSPECIFIED: ICD-10-CM

## 2022-06-21 DIAGNOSIS — I50.22 CHRONIC SYSTOLIC (CONGESTIVE) HEART FAILURE: ICD-10-CM

## 2022-06-21 DIAGNOSIS — E78.5 HYPERLIPIDEMIA, UNSPECIFIED: ICD-10-CM

## 2022-06-21 LAB
ALBUMIN SERPL ELPH-MCNC: 4.1 G/DL — SIGNIFICANT CHANGE UP (ref 3.3–5)
ALP SERPL-CCNC: 72 U/L — SIGNIFICANT CHANGE UP (ref 40–120)
ALT FLD-CCNC: 20 U/L — SIGNIFICANT CHANGE UP (ref 10–45)
ANION GAP SERPL CALC-SCNC: 9 MMOL/L — SIGNIFICANT CHANGE UP (ref 5–17)
APPEARANCE UR: CLEAR — SIGNIFICANT CHANGE UP
AST SERPL-CCNC: 20 U/L — SIGNIFICANT CHANGE UP (ref 10–40)
BACTERIA # UR AUTO: PRESENT /HPF
BASOPHILS # BLD AUTO: 0.03 K/UL — SIGNIFICANT CHANGE UP (ref 0–0.2)
BASOPHILS NFR BLD AUTO: 0.3 % — SIGNIFICANT CHANGE UP (ref 0–2)
BILIRUB SERPL-MCNC: 0.3 MG/DL — SIGNIFICANT CHANGE UP (ref 0.2–1.2)
BILIRUB UR-MCNC: NEGATIVE — SIGNIFICANT CHANGE UP
BUN SERPL-MCNC: 13 MG/DL — SIGNIFICANT CHANGE UP (ref 7–23)
CALCIUM SERPL-MCNC: 9.4 MG/DL — SIGNIFICANT CHANGE UP (ref 8.4–10.5)
CHLORIDE SERPL-SCNC: 104 MMOL/L — SIGNIFICANT CHANGE UP (ref 96–108)
CO2 SERPL-SCNC: 27 MMOL/L — SIGNIFICANT CHANGE UP (ref 22–31)
COLOR SPEC: YELLOW — SIGNIFICANT CHANGE UP
CREAT SERPL-MCNC: 0.8 MG/DL — SIGNIFICANT CHANGE UP (ref 0.5–1.3)
DIFF PNL FLD: ABNORMAL
EGFR: 106 ML/MIN/1.73M2 — SIGNIFICANT CHANGE UP
EOSINOPHIL # BLD AUTO: 0.17 K/UL — SIGNIFICANT CHANGE UP (ref 0–0.5)
EOSINOPHIL NFR BLD AUTO: 1.9 % — SIGNIFICANT CHANGE UP (ref 0–6)
EPI CELLS # UR: SIGNIFICANT CHANGE UP /HPF (ref 0–5)
GLUCOSE SERPL-MCNC: 79 MG/DL — SIGNIFICANT CHANGE UP (ref 70–99)
GLUCOSE UR QL: NEGATIVE — SIGNIFICANT CHANGE UP
HCT VFR BLD CALC: 36.9 % — LOW (ref 39–50)
HGB BLD-MCNC: 12.4 G/DL — LOW (ref 13–17)
IMM GRANULOCYTES NFR BLD AUTO: 0.3 % — SIGNIFICANT CHANGE UP (ref 0–1.5)
KETONES UR-MCNC: NEGATIVE — SIGNIFICANT CHANGE UP
LEUKOCYTE ESTERASE UR-ACNC: NEGATIVE — SIGNIFICANT CHANGE UP
LIDOCAIN IGE QN: 30 U/L — SIGNIFICANT CHANGE UP (ref 7–60)
LYMPHOCYTES # BLD AUTO: 3.33 K/UL — HIGH (ref 1–3.3)
LYMPHOCYTES # BLD AUTO: 36.7 % — SIGNIFICANT CHANGE UP (ref 13–44)
MAGNESIUM SERPL-MCNC: 1.9 MG/DL — SIGNIFICANT CHANGE UP (ref 1.6–2.6)
MCHC RBC-ENTMCNC: 30.6 PG — SIGNIFICANT CHANGE UP (ref 27–34)
MCHC RBC-ENTMCNC: 33.6 GM/DL — SIGNIFICANT CHANGE UP (ref 32–36)
MCV RBC AUTO: 91.1 FL — SIGNIFICANT CHANGE UP (ref 80–100)
MONOCYTES # BLD AUTO: 0.65 K/UL — SIGNIFICANT CHANGE UP (ref 0–0.9)
MONOCYTES NFR BLD AUTO: 7.2 % — SIGNIFICANT CHANGE UP (ref 2–14)
NEUTROPHILS # BLD AUTO: 4.87 K/UL — SIGNIFICANT CHANGE UP (ref 1.8–7.4)
NEUTROPHILS NFR BLD AUTO: 53.6 % — SIGNIFICANT CHANGE UP (ref 43–77)
NITRITE UR-MCNC: NEGATIVE — SIGNIFICANT CHANGE UP
NRBC # BLD: 0 /100 WBCS — SIGNIFICANT CHANGE UP (ref 0–0)
NT-PROBNP SERPL-SCNC: 432 PG/ML — HIGH (ref 0–300)
PH UR: 6 — SIGNIFICANT CHANGE UP (ref 5–8)
PLATELET # BLD AUTO: 282 K/UL — SIGNIFICANT CHANGE UP (ref 150–400)
POTASSIUM SERPL-MCNC: 4.4 MMOL/L — SIGNIFICANT CHANGE UP (ref 3.5–5.3)
POTASSIUM SERPL-SCNC: 4.4 MMOL/L — SIGNIFICANT CHANGE UP (ref 3.5–5.3)
PROT SERPL-MCNC: 6.5 G/DL — SIGNIFICANT CHANGE UP (ref 6–8.3)
PROT UR-MCNC: NEGATIVE MG/DL — SIGNIFICANT CHANGE UP
RBC # BLD: 4.05 M/UL — LOW (ref 4.2–5.8)
RBC # FLD: 13.4 % — SIGNIFICANT CHANGE UP (ref 10.3–14.5)
RBC CASTS # UR COMP ASSIST: < 5 /HPF — SIGNIFICANT CHANGE UP
SARS-COV-2 RNA SPEC QL NAA+PROBE: NEGATIVE — SIGNIFICANT CHANGE UP
SODIUM SERPL-SCNC: 140 MMOL/L — SIGNIFICANT CHANGE UP (ref 135–145)
SP GR SPEC: >=1.03 — SIGNIFICANT CHANGE UP (ref 1–1.03)
TROPONIN T SERPL-MCNC: 0.01 NG/ML — SIGNIFICANT CHANGE UP (ref 0–0.01)
UROBILINOGEN FLD QL: 0.2 E.U./DL — SIGNIFICANT CHANGE UP
WBC # BLD: 9.08 K/UL — SIGNIFICANT CHANGE UP (ref 3.8–10.5)
WBC # FLD AUTO: 9.08 K/UL — SIGNIFICANT CHANGE UP (ref 3.8–10.5)
WBC UR QL: < 5 /HPF — SIGNIFICANT CHANGE UP

## 2022-06-21 PROCEDURE — 71045 X-RAY EXAM CHEST 1 VIEW: CPT | Mod: 26

## 2022-06-21 PROCEDURE — 99285 EMERGENCY DEPT VISIT HI MDM: CPT | Mod: 25

## 2022-06-21 PROCEDURE — 93010 ELECTROCARDIOGRAM REPORT: CPT

## 2022-06-21 RX ORDER — METOPROLOL TARTRATE 50 MG
50 TABLET ORAL
Refills: 0 | Status: DISCONTINUED | OUTPATIENT
Start: 2022-06-21 | End: 2022-06-22

## 2022-06-21 RX ORDER — ASPIRIN/CALCIUM CARB/MAGNESIUM 324 MG
81 TABLET ORAL DAILY
Refills: 0 | Status: DISCONTINUED | OUTPATIENT
Start: 2022-06-21 | End: 2022-06-22

## 2022-06-21 RX ORDER — FUROSEMIDE 40 MG
1 TABLET ORAL
Qty: 0 | Refills: 0 | DISCHARGE

## 2022-06-21 RX ORDER — NITROGLYCERIN 6.5 MG
0.4 CAPSULE, EXTENDED RELEASE ORAL ONCE
Refills: 0 | Status: COMPLETED | OUTPATIENT
Start: 2022-06-21 | End: 2022-06-21

## 2022-06-21 RX ORDER — ENOXAPARIN SODIUM 100 MG/ML
40 INJECTION SUBCUTANEOUS EVERY 24 HOURS
Refills: 0 | Status: DISCONTINUED | OUTPATIENT
Start: 2022-06-22 | End: 2022-06-22

## 2022-06-21 RX ORDER — SPIRONOLACTONE 25 MG/1
1 TABLET, FILM COATED ORAL
Qty: 0 | Refills: 0 | DISCHARGE

## 2022-06-21 RX ORDER — SACUBITRIL AND VALSARTAN 24; 26 MG/1; MG/1
1 TABLET, FILM COATED ORAL
Refills: 0 | Status: DISCONTINUED | OUTPATIENT
Start: 2022-06-21 | End: 2022-06-22

## 2022-06-21 RX ORDER — ASPIRIN/CALCIUM CARB/MAGNESIUM 324 MG
325 TABLET ORAL ONCE
Refills: 0 | Status: COMPLETED | OUTPATIENT
Start: 2022-06-21 | End: 2022-06-21

## 2022-06-21 RX ORDER — CARVEDILOL PHOSPHATE 80 MG/1
1 CAPSULE, EXTENDED RELEASE ORAL
Qty: 0 | Refills: 0 | DISCHARGE

## 2022-06-21 RX ORDER — ATORVASTATIN CALCIUM 80 MG/1
80 TABLET, FILM COATED ORAL AT BEDTIME
Refills: 0 | Status: DISCONTINUED | OUTPATIENT
Start: 2022-06-21 | End: 2022-06-22

## 2022-06-21 RX ADMIN — Medication 325 MILLIGRAM(S): at 21:57

## 2022-06-21 RX ADMIN — Medication 0.4 MILLIGRAM(S): at 21:03

## 2022-06-21 NOTE — H&P ADULT - HISTORY OF PRESENT ILLNESS
51 yr old M with PMHx of HTN, hyperlipidemia, known LBBB, HFrEF (EF:25-30% by Echo 10/2021), NICM s/p BiV-ICD (in 2019 with Dr. Castro) who presents to [facility] ED 6/21/22 c/o intermittent chest pain x few days.     Patient describes it as being non-radiating midsternal chest pressure, worse with activity and improved with rest. Patient denies any N/V, diaphoresis, palpitations, dizziness, SOB, PND, orthopnea, LE edema, recent travel or sick contacts. He notes he has been without his cardiac medications for several days due to insurance issues.      In ED, BP: 150/93, HR: 60s, RR:18, Temp: 97.9F, O2 sat: 100% RA. EKG revealed SR@65BPM with known LBBB, no acute ST/T wave changes. CXR unremarkable. Labs notable for: Troponin T 0.01, , COVID PCR negative.    Patient treated with SL NTG 0.4mg x 1 dose with resolution of symptoms in ED.     Patient currently stable, admitted to cardiac telemetry for serial cardiac enzymes and further cardiac work-up.    51 yr old M former smoker with PMHx of HTN, hyperlipidemia, known LBBB, HFrEF (EF:25-30% by Echo 10/2021), NICM (normal coronaries by cath@[facility] 6/20/16) s/p BiV-ICD (in 2019 with Dr. Castro) who presents to [facility] ED 6/21/22 c/o intermittent chest pain x few days.     Patient describes it as being non-radiating midsternal chest pressure, worse with activity and improved with rest. Patient denies any N/V, diaphoresis, palpitations, dizziness, SOB, PND, orthopnea, LE edema, recent travel or sick contacts. He notes he has been without his cardiac medications for several days due to insurance issues.      In ED, BP: 150/93, HR: 60s, RR:18, Temp: 97.9F, O2 sat: 100% RA. EKG revealed SR@65BPM with known LBBB, no acute ST/T wave changes. CXR unremarkable. Labs notable for: Troponin T 0.01, , COVID PCR negative.    Patient treated with SL NTG 0.4mg x 1 dose with resolution of symptoms in ED.     Patient currently stable, admitted to cardiac telemetry for serial cardiac enzymes and further cardiac work-up.    Vaccination status: fully vaccinated and boosted with Pfizer vaccine     51 yr old M former smoker with PMHx of HTN, hyperlipidemia, known LBBB, HFrEF (EF:25-30% by Echo 10/2021), NICM (normal coronaries by cath@[facility] 6/20/16) s/p BiV-ICD (in 2019 with Dr. Holly), hx of COVID-19 (2021, not requiring hospitalization) who presents to [facility] ED 6/21/22 c/o intermittent chest pain x few days.     Patient describes it as being non-radiating midsternal chest pressure, 7/10 in severity, worse with activity and improved with rest. Patient denies any N/V, diaphoresis, palpitations, dizziness, SOB, PND, orthopnea, LE edema, recent travel or sick contacts. He notes he has been under a lot of stress as he has been without his cardiac medications for several days due to insurance issues, last dose 6/17/22.      In ED, BP: 150/93, HR: 60s, RR:18, Temp: 97.9F, O2 sat: 100% RA. EKG revealed SR@65BPM with known LBBB, no acute ST/T wave changes. CXR unremarkable. Labs notable for: Troponin T 0.01, , COVID PCR negative.    Patient treated with SL NTG 0.4mg x 1 dose with resolution of symptoms in ED.     Patient currently stable, admitted to cardiac telemetry for serial cardiac enzymes and further cardiac work-up.

## 2022-06-21 NOTE — ED ADULT NURSE REASSESSMENT NOTE - NS ED NURSE REASSESS COMMENT FT1
Updated on plan of care and verbalized understanding. Continuous cardiac/pulse oximetry monitoring in tact. Assessment on-going. Pt reports partial symptom relief s/p SL NTG. Updated on plan of care and verbalized understanding. Continuous cardiac/pulse oximetry monitoring in tact. Assessment on-going.

## 2022-06-21 NOTE — ED PROVIDER NOTE - CLINICAL SUMMARY MEDICAL DECISION MAKING FREE TEXT BOX
Patient presents to ED with concern for chest discomfort x several days - described by chest pressure, worse with activity and improved with rest.  No associated symptoms.  He has been out of his medications for several days due to problems with his insurance.  Currently having mild CP on arrival to ED - given SL nitro and labs, CXR ordered.  All results are reviewed and discussed with patient at bedside. Patient presents to ED with concern for chest discomfort x several days - described by chest pressure, worse with activity and improved with rest.  No associated symptoms.  He has been out of his medications for several days due to problems with his insurance.  Currently having mild CP on arrival to ED - given SL nitro and labs, CXR ordered.  All results are reviewed and discussed with patient at bedside.  Case is discussed with cardiology fellow on call who accepts admission on behalf of on call cardiologist, Dr. Keen.  Patient is aware of plan and in agreement.  Will admit at this time.

## 2022-06-21 NOTE — ED ADULT TRIAGE NOTE - CHIEF COMPLAINT QUOTE
Pt c/o chest pain since Saturday associated with SOB. Unable to take meds since Friday. On metropolol and aspirin.

## 2022-06-21 NOTE — ED ADULT NURSE NOTE - OBJECTIVE STATEMENT
pt presents to ER being very aggressive, loud, and rude towards staff members. yelling that his is having issues with his insurance company and primary care doctor and therefore has been unable to get his daily medications. pt states he has not taken his cardiac medications in 4 days and has now developed midsternal chest heaviness. pt denies dizziness, n/v, and sob.

## 2022-06-21 NOTE — H&P ADULT - NSHPOUTPATIENTPROVIDERS_GEN_ALL_CORE
ANÍBAL- Dr. Holly EP- Dr. Holly, CHF- NP Elsi Nelson, PCP- Igor Thomas/Emergency contact- [phone]

## 2022-06-21 NOTE — H&P ADULT - PROBLEM SELECTOR PLAN 1
currently chest pain free after receiving SL NTG in ED, EKG revealed SR@65BPM with known LBBB, no acute ST/T wave changes. CXR unremarkable.  --CE negative x 1 set, will F/U CE@12AM and 5AM.  --obtain Echocardiogram for structural assessment.    **Prior diagnostic cath@[facility] 6/20/2016 revealed normal coronaries.

## 2022-06-21 NOTE — ED PROVIDER NOTE - PHYSICAL EXAMINATION
VITAL SIGNS: I have reviewed nursing notes and confirm.  CONSTITUTIONAL: Non toxic; in no acute distress.   SKIN:  Warm and dry, no acute rash.   HEAD:  Normocephalic, atraumatic.  EYES: EOM intact; conjunctiva and sclera clear.  ENT: No nasal discharge; airway clear.   NECK: Supple; non tender.  CARD: S1, S2 normal; no murmurs, gallops, or rubs. Regular rate and rhythm.   RESP:  Clear to auscultation b/l, no wheezes, rales or rhonchi.  ABD: Normal bowel sounds; soft; non-distended; non-tender; no guarding/rebound.  EXT: Normal ROM. No clubbing, cyanosis or edema. 2+ pulses to b/l ue/le.  NEURO: Alert, oriented, grossly unremarkable.  No facial asymmetry.    PSYCH: Cooperative, mood and affect appropriate. Hypoxia

## 2022-06-21 NOTE — ED PROVIDER NOTE - OBJECTIVE STATEMENT
51 year old male with history of baseline LBBB ( ms), non ischemic cardiomyopathy, BiV-ICD implant (2019 with Dr. Mark) presents to ED with concern for intermittent chest pain over the past several days.  Patient describes pain as midsternal pressure without radiation.  Discomfort is worse with activity, noted to improve with rest.  He notes he has been without his cardiac medications for several days due to insurance issues.  Patient denies associated fever, chills, headache, visual changes, shortness of breath, abdominal pain, nausea, emesis, changes to bowel movements, peripheral edema, calf pain/tenderness, recent travel, known sick contacts or any additional acute complaints or concerns at this time.

## 2022-06-21 NOTE — PATIENT PROFILE ADULT - FALL HARM RISK - RISK INTERVENTIONS

## 2022-06-21 NOTE — H&P ADULT - ASSESSMENT
51 yr old M former smoker with PMHx of HTN, hyperlipidemia, known LBBB, HFrEF (EF:25-30% by Echo 10/2021), NICM s/p BiV-ICD (in 2019 with Dr. Holly) who presents to [facility] ED 6/21/22 c/o intermittent exertional chest pain x few days, EKG without acute changes, CXR unremarkable, CE negative x 1 set, admitted to cardiac telemetry for serial cardiac enzymes and further cardiac work-up.

## 2022-06-21 NOTE — H&P ADULT - NS ATTEND AMEND GEN_ALL_CORE FT
Initial attending contact date 6/22/22     . See PA note written above for details. I reviewed the PA documentation. I have personally seen and examined this patient. I reviewed vitals, labs, medications, cardiac studies, and additional imaging. I agree with the above PA's findings and plans as written above with the following additions/statements.    51 yr old M former smoker with PMHx of HTN, hyperlipidemia, known LBBB, HFrEF (EF:25-30% by Echo 10/2021), NICM s/p BiV-ICD (in 2019 with Dr. Holly) who presents to [facility] ED 6/21/22 c/o intermittent exertional chest pain x few days, EKG without acute changes, CXR unremarkable, CE negative x 1 set, admitted to cardiac telemetry for serial cardiac enzymes and further cardiac work-up.   -EKG VPaced, trop negative x 2  -with atypical CP, describes chest pressure occurring only in absence of taking HF meds?  -last ischemic work up 2016: cath - nonobstructive  -CCTA today - r/o progressive CAD  -Otherwise euvolemic on exam, no need for IV diuresis  -Cont ASA, statin , toprol, entresto  -DC pending CCTA results

## 2022-06-21 NOTE — H&P ADULT - NSHPSOCIALHISTORY_GEN_ALL_CORE
Tobacco: former smoker, quit >2 yrs ago, previously 3 cig/day.   ETOH: socially (2 drinks per week -- red wine, occasionally whiskey) 	  Illicit drugs: denies

## 2022-06-21 NOTE — H&P ADULT - PROBLEM SELECTOR PLAN 3
continue HOME MED: Atorvastatin 80mg PO qhs.  --F/U lipid panel.    N: DASH with 1 liter fluid restriction, NPO after midnight  E: Maintain K>4.0 and Mg >2.0  VTE PPx: Lovenox subcut  Code: FULL

## 2022-06-21 NOTE — H&P ADULT - PROBLEM SELECTOR PLAN 2
clinically euvolemic, will continue HOME MEDS: Entresto 97 mg-103 mg 1 tab PO q 12hrs and Metoprolol Succinate 50mg PO q 12hrs.  --Strict I/Os and daily weights.  --F/U Echo results.    **Prior Echo 10/20/21 revealed severely reduced left ventricular systolic function, EF:25-30%. Mild MR, trace TR.

## 2022-06-22 ENCOUNTER — TRANSCRIPTION ENCOUNTER (OUTPATIENT)
Age: 52
End: 2022-06-22

## 2022-06-22 ENCOUNTER — APPOINTMENT (OUTPATIENT)
Dept: HEART AND VASCULAR | Facility: CLINIC | Age: 52
End: 2022-06-22

## 2022-06-22 VITALS — HEART RATE: 59 BPM | SYSTOLIC BLOOD PRESSURE: 118 MMHG | DIASTOLIC BLOOD PRESSURE: 73 MMHG | RESPIRATION RATE: 18 BRPM

## 2022-06-22 LAB
A1C WITH ESTIMATED AVERAGE GLUCOSE RESULT: 6.1 % — HIGH (ref 4–5.6)
ANION GAP SERPL CALC-SCNC: 8 MMOL/L — SIGNIFICANT CHANGE UP (ref 5–17)
BUN SERPL-MCNC: 12 MG/DL — SIGNIFICANT CHANGE UP (ref 7–23)
CALCIUM SERPL-MCNC: 9.3 MG/DL — SIGNIFICANT CHANGE UP (ref 8.4–10.5)
CHLORIDE SERPL-SCNC: 104 MMOL/L — SIGNIFICANT CHANGE UP (ref 96–108)
CHOLEST SERPL-MCNC: 144 MG/DL — SIGNIFICANT CHANGE UP
CK MB CFR SERPL CALC: 2.5 NG/ML — SIGNIFICANT CHANGE UP (ref 0–6.7)
CK MB CFR SERPL CALC: 2.6 NG/ML — SIGNIFICANT CHANGE UP (ref 0–6.7)
CK SERPL-CCNC: 152 U/L — SIGNIFICANT CHANGE UP (ref 30–200)
CK SERPL-CCNC: 160 U/L — SIGNIFICANT CHANGE UP (ref 30–200)
CO2 SERPL-SCNC: 27 MMOL/L — SIGNIFICANT CHANGE UP (ref 22–31)
CREAT SERPL-MCNC: 0.85 MG/DL — SIGNIFICANT CHANGE UP (ref 0.5–1.3)
EGFR: 105 ML/MIN/1.73M2 — SIGNIFICANT CHANGE UP
ESTIMATED AVERAGE GLUCOSE: 128 MG/DL — HIGH (ref 68–114)
GLUCOSE SERPL-MCNC: 95 MG/DL — SIGNIFICANT CHANGE UP (ref 70–99)
HCT VFR BLD CALC: 42.5 % — SIGNIFICANT CHANGE UP (ref 39–50)
HDLC SERPL-MCNC: 62 MG/DL — SIGNIFICANT CHANGE UP
HGB BLD-MCNC: 14.2 G/DL — SIGNIFICANT CHANGE UP (ref 13–17)
LIPID PNL WITH DIRECT LDL SERPL: 62 MG/DL — SIGNIFICANT CHANGE UP
MAGNESIUM SERPL-MCNC: 1.8 MG/DL — SIGNIFICANT CHANGE UP (ref 1.6–2.6)
MCHC RBC-ENTMCNC: 30.1 PG — SIGNIFICANT CHANGE UP (ref 27–34)
MCHC RBC-ENTMCNC: 33.4 GM/DL — SIGNIFICANT CHANGE UP (ref 32–36)
MCV RBC AUTO: 90 FL — SIGNIFICANT CHANGE UP (ref 80–100)
NON HDL CHOLESTEROL: 82 MG/DL — SIGNIFICANT CHANGE UP
NRBC # BLD: 0 /100 WBCS — SIGNIFICANT CHANGE UP (ref 0–0)
PLATELET # BLD AUTO: 301 K/UL — SIGNIFICANT CHANGE UP (ref 150–400)
POTASSIUM SERPL-MCNC: 4.3 MMOL/L — SIGNIFICANT CHANGE UP (ref 3.5–5.3)
POTASSIUM SERPL-SCNC: 4.3 MMOL/L — SIGNIFICANT CHANGE UP (ref 3.5–5.3)
RBC # BLD: 4.72 M/UL — SIGNIFICANT CHANGE UP (ref 4.2–5.8)
RBC # FLD: 13.3 % — SIGNIFICANT CHANGE UP (ref 10.3–14.5)
SODIUM SERPL-SCNC: 139 MMOL/L — SIGNIFICANT CHANGE UP (ref 135–145)
TRIGL SERPL-MCNC: 98 MG/DL — SIGNIFICANT CHANGE UP
TROPONIN T SERPL-MCNC: 0.01 NG/ML — SIGNIFICANT CHANGE UP (ref 0–0.01)
TROPONIN T SERPL-MCNC: <0.01 NG/ML — SIGNIFICANT CHANGE UP (ref 0–0.01)
WBC # BLD: 7.85 K/UL — SIGNIFICANT CHANGE UP (ref 3.8–10.5)
WBC # FLD AUTO: 7.85 K/UL — SIGNIFICANT CHANGE UP (ref 3.8–10.5)

## 2022-06-22 PROCEDURE — C8929: CPT

## 2022-06-22 PROCEDURE — 80053 COMPREHEN METABOLIC PANEL: CPT

## 2022-06-22 PROCEDURE — 71045 X-RAY EXAM CHEST 1 VIEW: CPT

## 2022-06-22 PROCEDURE — 81001 URINALYSIS AUTO W/SCOPE: CPT

## 2022-06-22 PROCEDURE — 75574 CT ANGIO HRT W/3D IMAGE: CPT

## 2022-06-22 PROCEDURE — 80061 LIPID PANEL: CPT

## 2022-06-22 PROCEDURE — 85025 COMPLETE CBC W/AUTO DIFF WBC: CPT

## 2022-06-22 PROCEDURE — 87635 SARS-COV-2 COVID-19 AMP PRB: CPT

## 2022-06-22 PROCEDURE — 84484 ASSAY OF TROPONIN QUANT: CPT

## 2022-06-22 PROCEDURE — 36415 COLL VENOUS BLD VENIPUNCTURE: CPT

## 2022-06-22 PROCEDURE — 82553 CREATINE MB FRACTION: CPT

## 2022-06-22 PROCEDURE — 99222 1ST HOSP IP/OBS MODERATE 55: CPT

## 2022-06-22 PROCEDURE — 75574 CT ANGIO HRT W/3D IMAGE: CPT | Mod: 26

## 2022-06-22 PROCEDURE — 93306 TTE W/DOPPLER COMPLETE: CPT | Mod: 26

## 2022-06-22 PROCEDURE — G0378: CPT

## 2022-06-22 PROCEDURE — 99285 EMERGENCY DEPT VISIT HI MDM: CPT

## 2022-06-22 PROCEDURE — 83880 ASSAY OF NATRIURETIC PEPTIDE: CPT

## 2022-06-22 PROCEDURE — 82550 ASSAY OF CK (CPK): CPT

## 2022-06-22 PROCEDURE — 85027 COMPLETE CBC AUTOMATED: CPT

## 2022-06-22 PROCEDURE — 83735 ASSAY OF MAGNESIUM: CPT

## 2022-06-22 PROCEDURE — 83690 ASSAY OF LIPASE: CPT

## 2022-06-22 PROCEDURE — 80048 BASIC METABOLIC PNL TOTAL CA: CPT

## 2022-06-22 PROCEDURE — 83036 HEMOGLOBIN GLYCOSYLATED A1C: CPT

## 2022-06-22 RX ORDER — ATORVASTATIN CALCIUM 80 MG/1
1 TABLET, FILM COATED ORAL
Qty: 0 | Refills: 0 | DISCHARGE

## 2022-06-22 RX ORDER — ATORVASTATIN CALCIUM 80 MG/1
1 TABLET, FILM COATED ORAL
Qty: 30 | Refills: 3
Start: 2022-06-22 | End: 2022-10-19

## 2022-06-22 RX ORDER — ASPIRIN/CALCIUM CARB/MAGNESIUM 324 MG
1 TABLET ORAL
Qty: 0 | Refills: 0 | DISCHARGE

## 2022-06-22 RX ORDER — ASPIRIN/CALCIUM CARB/MAGNESIUM 324 MG
1 TABLET ORAL
Qty: 30 | Refills: 3
Start: 2022-06-22

## 2022-06-22 RX ORDER — SACUBITRIL AND VALSARTAN 24; 26 MG/1; MG/1
1 TABLET, FILM COATED ORAL
Qty: 0 | Refills: 0 | DISCHARGE

## 2022-06-22 RX ORDER — MAGNESIUM OXIDE 400 MG ORAL TABLET 241.3 MG
400 TABLET ORAL ONCE
Refills: 0 | Status: COMPLETED | OUTPATIENT
Start: 2022-06-22 | End: 2022-06-22

## 2022-06-22 RX ORDER — METOPROLOL TARTRATE 50 MG
1 TABLET ORAL
Qty: 30 | Refills: 3
Start: 2022-06-22

## 2022-06-22 RX ORDER — SACUBITRIL AND VALSARTAN 24; 26 MG/1; MG/1
1 TABLET, FILM COATED ORAL
Qty: 60 | Refills: 0
Start: 2022-06-22 | End: 2022-07-21

## 2022-06-22 RX ORDER — METOPROLOL TARTRATE 50 MG
1 TABLET ORAL
Qty: 0 | Refills: 0 | DISCHARGE

## 2022-06-22 RX ADMIN — SACUBITRIL AND VALSARTAN 1 TABLET(S): 24; 26 TABLET, FILM COATED ORAL at 10:27

## 2022-06-22 RX ADMIN — Medication 81 MILLIGRAM(S): at 10:27

## 2022-06-22 RX ADMIN — SACUBITRIL AND VALSARTAN 1 TABLET(S): 24; 26 TABLET, FILM COATED ORAL at 00:26

## 2022-06-22 RX ADMIN — ENOXAPARIN SODIUM 40 MILLIGRAM(S): 100 INJECTION SUBCUTANEOUS at 17:25

## 2022-06-22 RX ADMIN — MAGNESIUM OXIDE 400 MG ORAL TABLET 400 MILLIGRAM(S): 241.3 TABLET ORAL at 10:28

## 2022-06-22 RX ADMIN — Medication 50 MILLIGRAM(S): at 00:26

## 2022-06-22 RX ADMIN — ATORVASTATIN CALCIUM 80 MILLIGRAM(S): 80 TABLET, FILM COATED ORAL at 00:26

## 2022-06-22 RX ADMIN — Medication 50 MILLIGRAM(S): at 10:28

## 2022-06-22 NOTE — DISCHARGE NOTE PROVIDER - NSDCCPCAREPLAN_GEN_ALL_CORE_FT
PRINCIPAL DISCHARGE DIAGNOSIS  Diagnosis: Chest pain  Assessment and Plan of Treatment: You were admitted for evaluation and monitoring in setting of your chest pain. Your echocardiogram revealed _____. You had a a cardiac CTA which revealed ______.       PRINCIPAL DISCHARGE DIAGNOSIS  Diagnosis: Chest pain  Assessment and Plan of Treatment: You were admitted for evaluation and monitoring in setting of your chest pain. Your echocardiogram revealed your EF was reduced, overall stable from your prior in October. You had a a cardiac CTA which revealed normal coronaries, total calcium score of 0. Your heart failure medications were refilled. You were made an appointment with cardiologist, Dr. Farris.      SECONDARY DISCHARGE DIAGNOSES  Diagnosis: Chronic systolic heart failure  Assessment and Plan of Treatment: You have known history of HF with a reduced EF. While admitted your AICD was interrogated which revealed normal BiV-fxn with no events. Your heart failure medications were refilled.     PRINCIPAL DISCHARGE DIAGNOSIS  Diagnosis: Chest pain  Assessment and Plan of Treatment: You were admitted for evaluation and monitoring in setting of your chest pain. Your echocardiogram revealed your EF was reduced, overall stable from your prior in October. You had a a cardiac CTA which revealed normal coronaries, total calcium score of 0. Your heart failure medications were refilled. You were made an appointment with cardiologist, Dr. Farris.      SECONDARY DISCHARGE DIAGNOSES  Diagnosis: Chronic systolic heart failure  Assessment and Plan of Treatment: You have known history of HF with a reduced EF. While admitted your AICD was interrogated which revealed normal BiV-fxn with no events. Please follow up with EP team in 6 months for interogattion.   Your heart failure medications were refilled. You were made an appointment with cardiologist, Dr. Farris.

## 2022-06-22 NOTE — DISCHARGE NOTE PROVIDER - HOSPITAL COURSE
INCOMPLETE     51 yr old M former smoker with PMHx of HTN, hyperlipidemia, known LBBB, HFrEF (EF:25-30% by Echo 10/2021), NICM (normal coronaries by cath@[facility] 6/20/16) s/p BiV-ICD (in 2019 with Dr. Holly), hx of COVID-19 (2021, not requiring hospitalization) who presents to [facility] ED 6/21/22 c/o intermittent chest pain x few days.     Patient describes pain as being non-radiating midsternal chest pressure, 7/10 in severity, worse with activity and improved with rest. He notes he has been under a lot of stress as he has been without his cardiac medications for several days due to insurance issues, last dose 6/17/22.  BP: 150/93, HR: 60s, RR:18, Temp: 97.9F, O2 sat: 100% RA. EKG revealed SR@65BPM with known LBBB, no acute ST/T wave changes. CXR unremarkable. Labs notable for: Troponin neg x3, , COVID PCR negative.    Patient admitted to telemetry floor for monitoring. TTE w/ _____. Coronary CTA w/ ______.     No significant events on telemetry overnight. Repeat EKG without ischemic changes. Patient has been medically cleared for discharge as per  ________. Patient has been given appropriate discharge instructions including medication regimen, access site management and follow up. Medications that patient needs refills on have been e-prescribed to preferred pharmacy.     Temp HR BP RR SpO2  Gen: NAD, A&O x3  Cards: RRR, clear S1 and S2 without murmur  Pulm: CTA B/L without w/r/r  Right __: No hematoma or ooze, peripheral pulses 2+ B/L  Abd: soft, NT  Ext: no LE edema or ulcerations B/L       INCOMPLETE     51 yr old M former smoker with PMHx of HTN, hyperlipidemia, known LBBB, HFrEF (EF:25-30% by Echo 10/2021), NICM (normal coronaries by cath@[facility] 6/20/16) s/p BiV-ICD (in 2019 with Dr. Holly), hx of COVID-19 (2021, not requiring hospitalization) who presents to [facility] ED 6/21/22 c/o intermittent chest pain x few days.     Patient describes pain as being non-radiating midsternal chest pressure, 7/10 in severity, worse with activity and improved with rest. He notes he has been under a lot of stress as he has been without his cardiac medications for several days due to insurance issues, last dose 6/17/22.  BP: 150/93, HR: 60s, RR:18, Temp: 97.9F, O2 sat: 100% RA. EKG revealed SR@65BPM with known LBBB, no acute ST/T wave changes. CXR unremarkable. Labs notable for: Troponin neg x3, , COVID PCR negative.    Patient admitted to telemetry floor for monitoring. TTE w/ _____. Coronary CTA w/ ______. EP consulted for AICD interrogation which revealed ____     No significant events on telemetry overnight. Repeat EKG without ischemic changes. Patient has been medically cleared for discharge as per  ________. Patient has been given appropriate discharge instructions including medication regimen, access site management and follow up. Medications that patient needs refills on have been e-prescribed to preferred pharmacy.       Gen: NAD, A&O x3  Cards: RRR, clear S1 and S2 without murmur  Pulm: CTA B/L without w/r/r  Right __: No hematoma or ooze, peripheral pulses 2+ B/L  Abd: soft, NT  Ext: no LE edema or ulcerations B/L       INCOMPLETE     51 yr old M former smoker with PMHx of HTN, hyperlipidemia, known LBBB, HFrEF (EF:25-30% by Echo 10/2021), NICM (normal coronaries by cath@[facility] 6/20/16) s/p BiV-ICD (in 2019 with Dr. Holly), hx of COVID-19 (2021, not requiring hospitalization) who presents to [facility] ED 6/21/22 c/o intermittent chest pain x few days.     Patient describes pain as being non-radiating midsternal chest pressure, 7/10 in severity, worse with activity and improved with rest. He notes he has been under a lot of stress as he has been without his cardiac medications for several days due to insurance issues, last dose 6/17/22.  BP: 150/93, HR: 60s, RR:18, Temp: 97.9F, O2 sat: 100% RA. EKG revealed SR@65BPM with known LBBB, no acute ST/T wave changes. CXR unremarkable. Labs notable for: Troponin neg x3, , COVID PCR negative.    Patient admitted to telemetry floor for monitoring. TTE w/ _____. Coronary CTA w/ ______. EP consulted for AICD interrogation which revealed normal BiV ICD function, no arrhythmia/ICD shocks. CAn follow up in EP office in 6 months.     No significant events on telemetry overnight. Repeat EKG without ischemic changes. Patient has been medically cleared for discharge as per  ________. Patient has been given appropriate discharge instructions including medication regimen, access site management and follow up. Medications that patient needs refills on have been e-prescribed to preferred pharmacy.       Gen: NAD, A&O x3  Cards: RRR, clear S1 and S2 without murmur  Pulm: CTA B/L without w/r/r  Right __: No hematoma or ooze, peripheral pulses 2+ B/L  Abd: soft, NT  Ext: no LE edema or ulcerations B/L       INCOMPLETE     51 yr old M former smoker with PMHx of HTN, hyperlipidemia, known LBBB, HFrEF (EF:25-30% by Echo 10/2021), NICM (normal coronaries by cath@[facility] 6/20/16) s/p BiV-ICD (in 2019 with Dr. Holly), hx of COVID-19 (2021, not requiring hospitalization) who presents to [facility] ED 6/21/22 c/o intermittent chest pain x few days.     Patient describes pain as being non-radiating midsternal chest pressure, 7/10 in severity, worse with activity and improved with rest. He notes he has been under a lot of stress as he has been without his cardiac medications for several days due to insurance issues, last dose 6/17/22.  BP: 150/93, HR: 60s, RR:18, Temp: 97.9F, O2 sat: 100% RA. EKG revealed SR@65BPM with known LBBB, no acute ST/T wave changes. CXR unremarkable. Labs notable for: Troponin neg x3, , COVID PCR negative.    Patient admitted to telemetry floor for monitoring. TTE w/ EF 15-20%, mild diastolic dysfunction, no significant valvular disease . Coronary CTA w/ normal coronaries. EP consulted for AICD interrogation which revealed normal BiV ICD function, no arrhythmia/ICD shocks. Can follow up in EP office in 6 months.     No significant events on telemetry overnight. Repeat EKG without ischemic changes. Patient has been medically cleared for discharge as per  ________. Patient has been given appropriate discharge instructions including medication regimen, access site management and follow up. Medications that patient needs refills on have been e-prescribed to preferred pharmacy.       Gen: NAD, A&O x3  Cards: RRR, clear S1 and S2 without murmur  Pulm: CTA B/L without w/r/r  Abd: soft, NT  Ext: no LE edema or ulcerations B/L       INCOMPLETE     51 yr old M former smoker with PMHx of HTN, hyperlipidemia, known LBBB, HFrEF (EF:25-30% by Echo 10/2021), NICM (normal coronaries by cath@[facility] 6/20/16) s/p BiV-ICD (in 2019 with Dr. Holly), hx of COVID-19 (2021, not requiring hospitalization) who presents to [facility] ED 6/21/22 c/o intermittent chest pain x few days.     Patient describes pain as being non-radiating midsternal chest pressure, 7/10 in severity, worse with activity and improved with rest. He notes he has been under a lot of stress as he has been without his cardiac medications for several days due to insurance issues, last dose 6/17/22.  BP: 150/93, HR: 60s, RR:18, Temp: 97.9F, O2 sat: 100% RA. EKG revealed SR@65BPM with known LBBB, no acute ST/T wave changes. CXR unremarkable. Labs notable for: Troponin neg x3, , COVID PCR negative.    Patient admitted to telemetry floor for monitoring. TTE w/ EF 15-20%, mild diastolic dysfunction, no significant valvular disease . Coronary CTA w/ normal coronaries. EP consulted for AICD interrogation which revealed normal BiV ICD function, no arrhythmia/ICD shocks. Can follow up in EP office in 6 months.     No significant events on telemetry overnight. Repeat EKG without ischemic changes. Patient has been medically cleared for discharge as per Dr. Keen. Patient has been given appropriate discharge instructions including medication regimen and follow up. Medications that patient needs refills on have been e-prescribed to preferred pharmacy.       Gen: NAD, A&O x3  Cards: RRR, clear S1 and S2 without murmur  Pulm: CTA B/L without w/r/r  Abd: soft, NT  Ext: no LE edema or ulcerations B/L      Cardiac Rehab (STEMI/NSTEMI/ACS/Unstable Angina/CHF/Chronic Stable Angina/Heart Surgery (CABG,Valve)/Post PCI):              *Education on benefits of Cardiac Rehab provided to patient: Yes         *Referral and Prescription Given for Cardiac Rehab : Yes         *Pt given list of locations & instructed to contact their insurance company to review list of participating providers          *Pt instructed to bring Cardiac Rehab prescription with them to Cardiology Follow up appointment for assistance with enrollment: Yes         51 yr old M former smoker with PMHx of HTN, hyperlipidemia, known LBBB, HFrEF (EF:25-30% by Echo 10/2021), NICM (normal coronaries by cath@[facility] 6/20/16) s/p BiV-ICD (in 2019 with Dr. Holly), hx of COVID-19 (2021, not requiring hospitalization) who presents to [facility] ED 6/21/22 c/o intermittent chest pain x few days.     Patient describes pain as being non-radiating midsternal chest pressure, 7/10 in severity, worse with activity and improved with rest. He notes he has been under a lot of stress as he has been without his cardiac medications for several days due to insurance issues, last dose 6/17/22.  BP: 150/93, HR: 60s, RR:18, Temp: 97.9F, O2 sat: 100% RA. EKG revealed SR@65BPM with known LBBB, no acute ST/T wave changes. CXR unremarkable. Labs notable for: Troponin neg x3, , COVID PCR negative.    Patient admitted to telemetry floor for monitoring. TTE w/ EF 15-20%, mild diastolic dysfunction, no significant valvular disease . Coronary CTA w/ normal coronaries. EP consulted for AICD interrogation which revealed normal BiV ICD function, no arrhythmia/ICD shocks. Can follow up in EP office in 6 months.     No significant events on telemetry overnight. Repeat EKG without ischemic changes. Patient has been medically cleared for discharge as per Dr. Keen. Patient has been given appropriate discharge instructions including medication regimen and follow up. Medications that patient needs refills on have been e-prescribed to preferred pharmacy.       Gen: NAD, A&O x3  Cards: RRR, clear S1 and S2 without murmur  Pulm: CTA B/L without w/r/r  Abd: soft, NT  Ext: no LE edema or ulcerations B/L      Cardiac Rehab (STEMI/NSTEMI/ACS/Unstable Angina/CHF/Chronic Stable Angina/Heart Surgery (CABG,Valve)/Post PCI):              *Education on benefits of Cardiac Rehab provided to patient: Yes         *Referral and Prescription Given for Cardiac Rehab : Yes         *Pt given list of locations & instructed to contact their insurance company to review list of participating providers          *Pt instructed to bring Cardiac Rehab prescription with them to Cardiology Follow up appointment for assistance with enrollment: Yes

## 2022-06-22 NOTE — DISCHARGE NOTE PROVIDER - CARE PROVIDER_API CALL
Aundrea Farris G  CARDIOLOGY  [address] [address]  Phone: [phone]  Fax: [phone]  Scheduled Appointment: 07/08/2022

## 2022-06-22 NOTE — DISCHARGE NOTE NURSING/CASE MANAGEMENT/SOCIAL WORK - NSDCPEFALRISK_GEN_ALL_CORE
[facility]    Progress Note    [facility] Patient Status:  Inpatient    1946 MRN [id number]   Location [facility] 4W/SW/SE Attending Bin Whitley MD   Hosp Day # 4 PCP Edwina Jordan MD        Subjective:   Rehana Rosas 02/25/2021    HCT 32.8 (L) 02/25/2021    .0 02/25/2021    CREATSERUM 0.46 (L) 02/25/2021    BUN 12 02/25/2021     (L) 02/25/2021    K 4.0 02/25/2021    CL 95 (L) 02/25/2021    CO2 27.0 02/25/2021     (H) 02/25/2021    CA 8.9 02/25/2021 For information on Fall & Injury Prevention, visit: [email or web address] OR  [email or web address] OR  https://www.cdc.gov/steadi/patient.html

## 2022-06-22 NOTE — DISCHARGE NOTE PROVIDER - NSDCFUSCHEDAPPT_GEN_ALL_CORE_FT
[facility]  HEARTVASC [address]  Scheduled Appointment: 06/28/2022    [facility]  HEARTVASC [address]  Scheduled Appointment: 08/03/2022     [facility]  HEARTVASC [address]  Scheduled Appointment: 06/28/2022    Aundrea Farris  [facility]  HEARTVASC [address]  Scheduled Appointment: 07/08/2022    [facility]  HEARTVASC [address]  Scheduled Appointment: 08/03/2022

## 2022-06-22 NOTE — DISCHARGE NOTE PROVIDER - NSDCMRMEDTOKEN_GEN_ALL_CORE_FT
Aspirin Enteric Coated 81 mg oral delayed release tablet: 1 tab(s) orally once a day  atorvastatin 80 mg oral tablet: 1 tab(s) orally once a day  Entresto 97 mg-103 mg oral tablet: 1 tab(s) orally 2 times a day  Toprol-XL 50 mg oral tablet, extended release: 1 tab(s) orally 2 times a day   aspirin 81 mg oral delayed release tablet: 1 tab(s) orally once a day  atorvastatin 80 mg oral tablet: 1 tab(s) orally once a day (at bedtime)  cardiac rehab: You are given prescription for cardiac rehab for 3 times a week for 12 weeks. Please bring this prescription to your cardiology appointment.   Entresto 97 mg-103 mg oral tablet: 1 tab(s) orally 2 times a day  Toprol-XL 50 mg oral tablet, extended release: 1 tab(s) orally 2 times a day

## 2022-06-22 NOTE — PROGRESS NOTE ADULT - SUBJECTIVE AND OBJECTIVE BOX
EPS Device interrogation    Indication:  pt known to Dr. Holly' service. Has a BiV ICD.  He presented yesterday to ER due to unable to get his regular HF meds filled.   EPS is called to interrogate his device.   Pt reports no palpitations.     Device model: 	Tasqe BiV-ICD     Implanting Physician: Avni     Functioning Mode:  DDD  bpm. 	    Underlying Rhythm:  NSR 62 bpm. LBBB    Pacemaker dependency: NO. Is BiV pacing due to the nature of the device. But not pacing dependent   Has good BiV pacing 97%  AP < 1%    Battery status: 6 years     Lead parameters:   				  RA lead:    Sense:  6.9    mV.              Threshold:  1   V at  0.5   ms.           Impedance:  740     ohms  RV lead:    Sense: 18.3    mV.              Threshold: 0.7   V at  0.5  ms.           Impedance:  340     ohms  LV lead:    Sense: 17     mV.              Threshold:  0.9    V at 0.5   ms.           Impedance:  830     ohms    Shock coil Impedance: 77 ohms     Observations:  normal BiV ICD function  NO ventricular arrhythmia / ICD shocks.   Occasional AT (few seconds to mins. Longest 22 mins on june 8) --known.   [employer] stable at 3.  ie not suggestive of volume overload.   He can f/u with Dr. Holly in 6 months.  He was supposed to see us in clinic today.

## 2022-06-22 NOTE — DISCHARGE NOTE NURSING/CASE MANAGEMENT/SOCIAL WORK - PATIENT PORTAL LINK FT
You can access the FollowMyHealth Patient Portal offered by [facility] by registering at the following website: [email or web address]. By joining [employer]’s FollowMyHealth portal, you will also be able to view your health information using other applications (apps) compatible with our system.

## 2022-06-24 DIAGNOSIS — Z87.891 PERSONAL HISTORY OF NICOTINE DEPENDENCE: ICD-10-CM

## 2022-06-24 DIAGNOSIS — I47.1 SUPRAVENTRICULAR TACHYCARDIA: ICD-10-CM

## 2022-06-24 DIAGNOSIS — Z95.810 PRESENCE OF AUTOMATIC (IMPLANTABLE) CARDIAC DEFIBRILLATOR: ICD-10-CM

## 2022-06-24 DIAGNOSIS — I25.5 ISCHEMIC CARDIOMYOPATHY: ICD-10-CM

## 2022-06-24 DIAGNOSIS — Z79.82 LONG TERM (CURRENT) USE OF ASPIRIN: ICD-10-CM

## 2022-06-24 DIAGNOSIS — E78.5 HYPERLIPIDEMIA, UNSPECIFIED: ICD-10-CM

## 2022-06-24 DIAGNOSIS — I50.22 CHRONIC SYSTOLIC (CONGESTIVE) HEART FAILURE: ICD-10-CM

## 2022-06-24 DIAGNOSIS — I11.0 HYPERTENSIVE HEART DISEASE WITH HEART FAILURE: ICD-10-CM

## 2022-06-24 DIAGNOSIS — I44.7 LEFT BUNDLE-BRANCH BLOCK, UNSPECIFIED: ICD-10-CM

## 2022-06-24 DIAGNOSIS — R07.9 CHEST PAIN, UNSPECIFIED: ICD-10-CM

## 2022-06-24 DIAGNOSIS — Z86.16 PERSONAL HISTORY OF COVID-19: ICD-10-CM

## 2022-06-25 NOTE — DISCHARGE NOTE PROVIDER - NSDCACTIVITY_GEN_ALL_CORE
Problem: Discharge Planning  Goal: Discharge to home or other facility with appropriate resources  Outcome: Progressing  Flowsheets  Taken 6/24/2022 2156  Discharge to home or other facility with appropriate resources:   Identify barriers to discharge with patient and caregiver   Identify discharge learning needs (meds, wound care, etc)  Taken 6/24/2022 2130  Discharge to home or other facility with appropriate resources:   Identify barriers to discharge with patient and caregiver   Arrange for needed discharge resources and transportation as appropriate   Identify discharge learning needs (meds, wound care, etc)     Problem: Safety - Adult  Goal: Free from fall injury  Outcome: Progressing     Problem: ABCDS Injury Assessment  Goal: Absence of physical injury  Outcome: Progressing No restrictions

## 2022-06-28 ENCOUNTER — NON-APPOINTMENT (OUTPATIENT)
Age: 52
End: 2022-06-28

## 2022-06-28 ENCOUNTER — APPOINTMENT (OUTPATIENT)
Dept: HEART AND VASCULAR | Facility: CLINIC | Age: 52
End: 2022-06-28

## 2022-06-28 PROCEDURE — G2066: CPT

## 2022-06-28 PROCEDURE — 93297 REM INTERROG DEV EVAL ICPMS: CPT

## 2022-07-14 ENCOUNTER — APPOINTMENT (OUTPATIENT)
Dept: HEART AND VASCULAR | Facility: CLINIC | Age: 52
End: 2022-07-14

## 2022-07-14 ENCOUNTER — NON-APPOINTMENT (OUTPATIENT)
Age: 52
End: 2022-07-14

## 2022-07-14 VITALS
DIASTOLIC BLOOD PRESSURE: 62 MMHG | HEIGHT: 69 IN | BODY MASS INDEX: 30.81 KG/M2 | TEMPERATURE: 97.2 F | SYSTOLIC BLOOD PRESSURE: 110 MMHG | OXYGEN SATURATION: 96 % | WEIGHT: 208 LBS | HEART RATE: 68 BPM

## 2022-07-14 PROCEDURE — 99214 OFFICE O/P EST MOD 30 MIN: CPT | Mod: 25

## 2022-07-14 PROCEDURE — 93000 ELECTROCARDIOGRAM COMPLETE: CPT

## 2022-07-14 RX ORDER — ASPIRIN ENTERIC COATED TABLETS 81 MG 81 MG/1
81 TABLET, DELAYED RELEASE ORAL
Qty: 30 | Refills: 0 | Status: DISCONTINUED | COMMUNITY
Start: 2019-02-11 | End: 2022-07-14

## 2022-07-14 NOTE — HISTORY OF PRESENT ILLNESS
[FreeTextEntry1] : 52 M DCM EF 15-20% BivAICD\par \par was in the hospital for chest pain ran out of his cardiac medications he feels well has no recent admissions for systolic HF\par \par \par ecg sr bivaicd 7/14/2022\par CCTA 6/2022 CAC score is 0\par Echo 6/2022 EF 15-20% LVID 5.3 cm

## 2022-07-14 NOTE — ASSESSMENT
[FreeTextEntry1] : - Systolic HF add farxiga 10 mg daily\par - continue entresto 97/101 bid and toprol xl 50 mg bid\par - stop aspirin\par - does not need such high doses of statin\par - cardiac rehab\par - fu in four weeks

## 2022-08-03 ENCOUNTER — NON-APPOINTMENT (OUTPATIENT)
Age: 52
End: 2022-08-03

## 2022-08-03 ENCOUNTER — APPOINTMENT (OUTPATIENT)
Dept: HEART AND VASCULAR | Facility: CLINIC | Age: 52
End: 2022-08-03

## 2022-08-03 PROCEDURE — 93295 DEV INTERROG REMOTE 1/2/MLT: CPT

## 2022-08-03 PROCEDURE — 93296 REM INTERROG EVL PM/IDS: CPT

## 2022-09-09 ENCOUNTER — APPOINTMENT (OUTPATIENT)
Dept: HEART AND VASCULAR | Facility: CLINIC | Age: 52
End: 2022-09-09

## 2022-09-09 VITALS
TEMPERATURE: 99.1 F | BODY MASS INDEX: 30.66 KG/M2 | HEIGHT: 69 IN | HEART RATE: 69 BPM | DIASTOLIC BLOOD PRESSURE: 71 MMHG | OXYGEN SATURATION: 98 % | WEIGHT: 206.99 LBS | SYSTOLIC BLOOD PRESSURE: 114 MMHG

## 2022-09-09 PROCEDURE — 36415 COLL VENOUS BLD VENIPUNCTURE: CPT

## 2022-09-09 PROCEDURE — 99214 OFFICE O/P EST MOD 30 MIN: CPT | Mod: 25

## 2022-09-11 NOTE — ASSESSMENT
[FreeTextEntry1] : - Systolic HF add aldctone 25 mg dialy \par - continue entresto 97/101 bid and toprol xl 50 mg bid\par - stop aspirin\par - does not need such high doses of statin\par - fu in three months at which time will increase his metoprolol

## 2022-09-11 NOTE — HISTORY OF PRESENT ILLNESS
[FreeTextEntry1] : 52 M DCM EF 15-20% BivAICD\par \par here for fu stopped SGLT2 due to dizziness he does not want to restart at a lower dose and is very busy at work no sob defering cardiac rehab for now \par \par \par ecg sr bivaicd 7/14/2022\par CCTA 6/2022 CAC score is 0\par Echo 6/2022 EF 15-20% LVID 5.3 cm

## 2022-09-12 LAB
ANION GAP SERPL CALC-SCNC: 14 MMOL/L
BUN SERPL-MCNC: 14 MG/DL
CALCIUM SERPL-MCNC: 9.9 MG/DL
CHLORIDE SERPL-SCNC: 102 MMOL/L
CO2 SERPL-SCNC: 24 MMOL/L
CREAT SERPL-MCNC: 0.77 MG/DL
EGFR: 107 ML/MIN/1.73M2
GLUCOSE SERPL-MCNC: 87 MG/DL
NT-PROBNP SERPL-MCNC: 86 PG/ML
POTASSIUM SERPL-SCNC: 4.9 MMOL/L
SODIUM SERPL-SCNC: 140 MMOL/L

## 2022-10-11 ENCOUNTER — NON-APPOINTMENT (OUTPATIENT)
Age: 52
End: 2022-10-11

## 2022-10-11 ENCOUNTER — APPOINTMENT (OUTPATIENT)
Dept: HEART AND VASCULAR | Facility: CLINIC | Age: 52
End: 2022-10-11

## 2022-10-11 PROCEDURE — G2066: CPT

## 2022-10-11 PROCEDURE — 93297 REM INTERROG DEV EVAL ICPMS: CPT

## 2022-11-02 ENCOUNTER — APPOINTMENT (OUTPATIENT)
Dept: HEART AND VASCULAR | Facility: CLINIC | Age: 52
End: 2022-11-02

## 2022-11-02 ENCOUNTER — NON-APPOINTMENT (OUTPATIENT)
Age: 52
End: 2022-11-02

## 2022-11-02 PROCEDURE — 93295 DEV INTERROG REMOTE 1/2/MLT: CPT

## 2022-11-02 PROCEDURE — 93296 REM INTERROG EVL PM/IDS: CPT

## 2022-12-13 ENCOUNTER — APPOINTMENT (OUTPATIENT)
Dept: HEART AND VASCULAR | Facility: CLINIC | Age: 52
End: 2022-12-13

## 2022-12-13 ENCOUNTER — NON-APPOINTMENT (OUTPATIENT)
Age: 52
End: 2022-12-13

## 2022-12-13 PROCEDURE — 93297 REM INTERROG DEV EVAL ICPMS: CPT

## 2022-12-13 PROCEDURE — G2066: CPT

## 2023-01-03 ENCOUNTER — NON-APPOINTMENT (OUTPATIENT)
Age: 53
End: 2023-01-03

## 2023-01-03 ENCOUNTER — APPOINTMENT (OUTPATIENT)
Dept: HEART AND VASCULAR | Facility: CLINIC | Age: 53
End: 2023-01-03
Payer: MEDICAID

## 2023-01-03 VITALS
WEIGHT: 208 LBS | HEIGHT: 69 IN | HEART RATE: 91 BPM | TEMPERATURE: 98.2 F | DIASTOLIC BLOOD PRESSURE: 70 MMHG | SYSTOLIC BLOOD PRESSURE: 118 MMHG | BODY MASS INDEX: 30.81 KG/M2 | OXYGEN SATURATION: 98 %

## 2023-01-03 PROCEDURE — 36415 COLL VENOUS BLD VENIPUNCTURE: CPT

## 2023-01-03 PROCEDURE — 99214 OFFICE O/P EST MOD 30 MIN: CPT | Mod: 25

## 2023-01-03 PROCEDURE — 93000 ELECTROCARDIOGRAM COMPLETE: CPT

## 2023-01-03 RX ORDER — CHLORHEXIDINE GLUCONATE 4 %
1000 LIQUID (ML) TOPICAL
Qty: 90 | Refills: 0 | Status: ACTIVE | COMMUNITY
Start: 2022-08-02

## 2023-01-03 RX ORDER — DAPAGLIFLOZIN 5 MG/1
5 TABLET, FILM COATED ORAL
Qty: 30 | Refills: 0 | Status: DISCONTINUED | COMMUNITY
Start: 2022-07-25 | End: 2023-01-03

## 2023-01-03 RX ORDER — DAPAGLIFLOZIN 10 MG/1
10 TABLET, FILM COATED ORAL
Qty: 30 | Refills: 0 | Status: DISCONTINUED | COMMUNITY
Start: 2022-07-16 | End: 2023-01-03

## 2023-01-03 NOTE — ASSESSMENT
[FreeTextEntry1] : - Systolic HF check blood work \par - continue entresto 97/101 bid aldactone 25 mg daily inc toprol xl 50 mg 2 tab in am and one in pm\par - stop aspirin\par - does not need such high doses of statin\par - fu in three months will readdress low dose sglt2 \par - repeat echo

## 2023-01-04 LAB
ANION GAP SERPL CALC-SCNC: 14 MMOL/L
BUN SERPL-MCNC: 11 MG/DL
CALCIUM SERPL-MCNC: 9.8 MG/DL
CHLORIDE SERPL-SCNC: 104 MMOL/L
CO2 SERPL-SCNC: 25 MMOL/L
CREAT SERPL-MCNC: 0.8 MG/DL
EGFR: 106 ML/MIN/1.73M2
GLUCOSE SERPL-MCNC: 106 MG/DL
NT-PROBNP SERPL-MCNC: 68 PG/ML
POTASSIUM SERPL-SCNC: 4.2 MMOL/L
SODIUM SERPL-SCNC: 143 MMOL/L

## 2023-01-18 ENCOUNTER — APPOINTMENT (OUTPATIENT)
Dept: HEART AND VASCULAR | Facility: CLINIC | Age: 53
End: 2023-01-18

## 2023-02-01 ENCOUNTER — APPOINTMENT (OUTPATIENT)
Dept: HEART AND VASCULAR | Facility: CLINIC | Age: 53
End: 2023-02-01
Payer: MEDICAID

## 2023-02-01 ENCOUNTER — NON-APPOINTMENT (OUTPATIENT)
Age: 53
End: 2023-02-01

## 2023-02-01 PROCEDURE — 93295 DEV INTERROG REMOTE 1/2/MLT: CPT

## 2023-02-01 PROCEDURE — 93296 REM INTERROG EVL PM/IDS: CPT

## 2023-02-27 ENCOUNTER — NON-APPOINTMENT (OUTPATIENT)
Age: 53
End: 2023-02-27

## 2023-03-06 ENCOUNTER — NON-APPOINTMENT (OUTPATIENT)
Age: 53
End: 2023-03-06

## 2023-03-06 ENCOUNTER — APPOINTMENT (OUTPATIENT)
Dept: HEART AND VASCULAR | Facility: CLINIC | Age: 53
End: 2023-03-06
Payer: MEDICAID

## 2023-03-06 PROCEDURE — 93296 REM INTERROG EVL PM/IDS: CPT

## 2023-03-06 PROCEDURE — 93295 DEV INTERROG REMOTE 1/2/MLT: CPT

## 2023-03-16 NOTE — H&P ADULT - NSCORESITESY/N_GEN_A_CORE_RD
Post-Test Genetic Counseling Consult Note  Today I spoke with Gennaro Gonzalez over the phone to review the results of his genetic test for hereditary cancer  He met previously with Hector Anders on 3/11 for pre-test counseling  SUMMARY:    Test(s): CustomNext: Cancer® +RNAinsight® (61 genes): APC, ELIO, AXIN2, BAP1, BARD1, BMPR1A, BRCA1, BRCA2, BRIP1, CDH1, CDK4, CDKN1B, CDKN2A, CHEK2, CTNNA1, DICER1, EGLN1, EPCAM, FH, FLCN, GREM1, HOXB13, KIF1B, KIT, MAX, MEN1, MET, MITF, MLH1, MSH2, MSH3, MSH6, MUTYH, NBN, NF1, NTHL1, PALB2, PDGFRA, PMS2, POLD1, POLE, POT1, PTEN, RAD51C, RAD51D, RB1, RECQL, RET, SDHA, SDHAF2, SDHB, SDHC, SDHD, SMAD4, SMARCA4, STK11, ATKX442, TP53, TSC1, TSC2, VHL    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, Heterozygous, 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     Additional Result: Variant of Uncertain Significance:  TSC1 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>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), Heterozygous, Uncertain Significance    Assessment:   Gennaro Gonzalez 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, specifically c 2436-1G>A and 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  It is not known how many people are MSH3-associated polyposis carriers in the general population, but the percentage is thought to be low (<1%)  Being a carrier means that they do NOT have features of MSH3-associated polyposis but can potentially have children who are affected  Currently there no cancer risks known to be associated with carrying one pathogenic variant in the MSH3 gene  Information on MSH3-associated polyposis:   Individuals who inherit two MSH3 mutations, one from each parent, have a  condition called MSH3-associated polyposis  MSH3-associated polyposis is characterized by the development of numerous colorectal and intestinal polyps, as well as colorectal and stomach cancers  The specific lifetime risks of these cancers are currently unknown but are thought to be increased over the general population    There may also be an increased risk of developing brain tumors, but information regarding this association is limited  To summarize Kateryna Alejandra 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 and is not currently known to be at an increased risk for polyps or cancer compared to the general population  Reproductive Risks:  If both Kateryna Alejandra and her partner carry an MSH3 pathogenic variant, there is a 25% chance (1 in 4) to have a child with MSH3-associated polyposis with each pregnancy  It is recommended that Cleo Reyes partner be tested for mutations in the MSH3 gene to help determine the risk for Jose Alejandros children to have MSH3-associated polyposis  If the father of Cleo Reyes children is not available for testing, Alexandro's children may consider genetic testing to see if they carry 2 pathogenic variants in this gene  For patients of reproductive age, there are options for prenatal diagnosis and assisted reproduction including pre-implantation genetic diagnosis  Risk and Testing for Family Members: This test result may help clarify the risk for other family members  All first-degree relatives (parents, siblings and children) have up to a 50% chance of having the same MSH3 pathogenic variant as Kateryna Alejandra  Other relatives such as aunts, uncles and cousins may also be at risk  Since it is not known with one-hundred percent certainty which side of the family this MSH3 pathogenic variant came from, we recommend that Kateryna Alejandra share this test results with extended family members from both sides of her family  We encouraged Kateryna Alejandra  to discuss this information with his relatives  If Kateryna Alejandra family members have any questions or are interested in testing they can reach out to the [facility] Genetics number at [phone] for additional information       Assessment for MSH3 VUS:  A variant of uncertain significance (VUS) means that a change was identified in a specific gene but it cannot be determined whether the variant is associated with an increased risk of cancer or is a harmless genetic change  The significance of the MSH3 variant is currently not known and therefore this test result cannot be used to help determine Heather Contreras cancer risks  It is possible that the variant was seen in only a handful of individuals, or there may be conflicting or incomplete information in the medical literature about the variant and its association with hereditary cancer  Genetic testing for this variant is not recommended for relatives who wish to determine their cancer risks for purposes of determining medical management  The presence or absence of this variant in a relative is not clinically meaningful unless the variant is reclassified in the future  The laboratory will continue to accumulate information on this variant and will reclassify it as either a positive or negative genetic test result when they are confident that they have adequate information  As updated information is obtained, we will notify Heather Contreras with the updated information  It is important to note that the majority of variants of uncertain significance are reclassified as likely benign or benign as additional information about the variant becomes available  Plan:   There are no additional recommendations based on Alexandro's result  he should continue cancer screening and medical management as clinically indicated and as determined appropriate by his healthcare providers  Positive Result: Heather Contreras was strongly encouraged to follow up on with our office on an annual basis to review the most up to date guidelines as recommendations are subject to change over time    VUS Result: Heather Contreras was strongly encouraged to contact us regarding any changes in his personal or family history of cancer as these changes could alter our recommendation regarding genetic testing and/or cancer screening  Heather Contreras was also encouraged to follow up with us on an annual basis as variant classifications are subject to change  No

## 2023-04-11 ENCOUNTER — APPOINTMENT (OUTPATIENT)
Dept: HEART AND VASCULAR | Facility: CLINIC | Age: 53
End: 2023-04-11
Payer: MEDICAID

## 2023-04-11 ENCOUNTER — NON-APPOINTMENT (OUTPATIENT)
Age: 53
End: 2023-04-11

## 2023-04-11 PROCEDURE — 93297 REM INTERROG DEV EVAL ICPMS: CPT

## 2023-04-11 PROCEDURE — G2066: CPT

## 2023-04-26 ENCOUNTER — NON-APPOINTMENT (OUTPATIENT)
Age: 53
End: 2023-04-26

## 2023-04-26 ENCOUNTER — APPOINTMENT (OUTPATIENT)
Dept: HEART AND VASCULAR | Facility: CLINIC | Age: 53
End: 2023-04-26
Payer: MEDICAID

## 2023-04-26 VITALS
RESPIRATION RATE: 16 BRPM | HEIGHT: 69 IN | SYSTOLIC BLOOD PRESSURE: 98 MMHG | DIASTOLIC BLOOD PRESSURE: 64 MMHG | OXYGEN SATURATION: 97 % | BODY MASS INDEX: 30.66 KG/M2 | HEART RATE: 85 BPM | WEIGHT: 207 LBS | TEMPERATURE: 99 F

## 2023-04-26 PROCEDURE — 93284 PRGRMG EVAL IMPLANTABLE DFB: CPT

## 2023-04-26 PROCEDURE — 99212 OFFICE O/P EST SF 10 MIN: CPT | Mod: 25

## 2023-04-26 NOTE — REASON FOR VISIT
[Follow-up Device Check] : is here today for a follow-up device check visit for [Follow-Up - Clinic] : a clinic follow-up of [Cardiomyopathy] : cardiomyopathy

## 2023-05-02 NOTE — ADDENDUM
[FreeTextEntry1] : I, Hector Hinds, am scribing for and the presence of Dr. Holly the following sections: HPI, PMH,Family/social history, ROS, Physical Exam, Assessment / Plan.\par  IMikel, personally performed the services described in the documentation, reviewed the documentation recorded by the scribe in my presence and it accurately and completely records my words and actions.\par

## 2023-05-02 NOTE — PROCEDURE
[No] : not [NSR] : normal sinus rhythm [CRT-D] : Cardiac resynchronization therapy defibrillator [DDD] : DDD [Threshold Testing Performed] : Threshold testing was performed [Lead Imp:  ___ohms] : lead impedance was [unfilled] ohms [Sensing Amplitude ___mv] : sensing amplitude was [unfilled] mv [___V @] : [unfilled] V [___ ms] : [unfilled] ms [None] : none [de-identified] : [facility]  [de-identified] : [employer]  [de-identified] : [id number] [de-identified] : 3/25/19 [de-identified] : 45/130 [de-identified] : 5 years [de-identified] : shock impedance 79\par AP 1%\par biV paced 97%\par no events - heart rates overall within normal limits

## 2023-05-02 NOTE — DISCUSSION/SUMMARY
[AICD Function Normal] : normal AICD function [FreeTextEntry1] : Mr. Cheema is a 52 year old male with a LBBB and non ischemic cardiomyopathy, on optimal heart failure medications s/p BiV 3/25/19.   ICD interrogation reveals normal function and all measured data is within normal limits.  No sustained arrhythmias and fast heart rates likely sinus tachycardia as he states he is sometimes active at night    He is asymptomatic from this and on Metoprolol.  He will continue remote monitoring and follow up in 1 year or sooner if needed.  He knows to call with any questions or concerns

## 2023-05-02 NOTE — REVIEW OF SYSTEMS
[Negative] : Heme/Lymph [Fever] : no fever [Weight Gain (___ Lbs)] : no recent weight gain [Chills] : no chills [Feeling Fatigued] : not feeling fatigued [Weight Loss (___ Lbs)] : no recent weight loss [SOB] : no shortness of breath [Dyspnea on exertion] : not dyspnea during exertion [Chest Discomfort] : no chest discomfort [Palpitations] : no palpitations [Syncope] : no syncope [Cough] : no cough [Dizziness] : no dizziness

## 2023-05-02 NOTE — HISTORY OF PRESENT ILLNESS
[Palpitations] : no palpitations [SOB] : no dyspnea [Syncope] : no syncope [Dizziness] : no dizziness [Chest Pain] : no chest pain or discomfort [ICD Shocks] : no recent ICD shocks [Shoulder Pain] : no shoulder pain [Pain at Site] : no pain at device site [Erythema at Site] : no erythema at device site [Swelling at Site] : no swelling at device site [FreeTextEntry1] : Mr. Cheema is a 52 year old male with a LBBB and non ischemic cardiomyopathy, on optimal heart failure medications s/p BiV 3/25/19. \par He presents for routine follow up and overall feels well with no complaints.   He has been noted to have tachycardia on remote transmission and we have tried to contact him.  Stable exercise tolerance.  He is on Metoprolol.  No palpitations, syncope, near syncope, orthopnea, PND.  No device related issues.

## 2023-05-02 NOTE — PHYSICAL EXAM
[General Appearance - Well Developed] : well developed [Normal Appearance] : normal appearance [Well Groomed] : well groomed [General Appearance - Well Nourished] : well nourished [No Deformities] : no deformities [General Appearance - In No Acute Distress] : no acute distress [Heart Rate And Rhythm] : heart rate and rhythm were normal [Heart Sounds] : normal S1 and S2 [Edema] : no peripheral edema present [Respiration, Rhythm And Depth] : normal respiratory rhythm and effort [Exaggerated Use Of Accessory Muscles For Inspiration] : no accessory muscle use [Left Infraclavicular] : left infraclavicular area [Clean] : clean [Dry] : dry [Well-Healed] : well-healed [Normal Conjunctiva] : the conjunctiva exhibited no abnormalities [Normal Oropharynx] : normal oropharynx [Abnormal Walk] : normal gait [Gait - Sufficient For Exercise Testing] : the gait was sufficient for exercise testing [Skin Color & Pigmentation] : normal skin color and pigmentation [] : no rash [Oriented To Time, Place, And Person] : oriented to person, place, and time [Impaired Insight] : insight and judgment were intact [Affect] : the affect was normal [Mood] : the mood was normal [No Anxiety] : not feeling anxious [Auscultation Breath Sounds / Voice Sounds] : lungs were clear to auscultation bilaterally [Palpable Crepitus] : no palpable crepitus [Bleeding] : no active bleeding [Foul Odor] : no foul smell [Purulent Drainage] : no purulent drainage [Serous Drainage] : no serous drainage [Erythema] : not erythematous [Warm] : not warm [Tender] : not tender [Indurated] : not indurated [Fluctuant] : not fluctuant

## 2023-05-10 ENCOUNTER — APPOINTMENT (OUTPATIENT)
Dept: HEART AND VASCULAR | Facility: CLINIC | Age: 53
End: 2023-05-10
Payer: MEDICAID

## 2023-05-10 PROCEDURE — 93306 TTE W/DOPPLER COMPLETE: CPT

## 2023-05-30 ENCOUNTER — NON-APPOINTMENT (OUTPATIENT)
Age: 53
End: 2023-05-30

## 2023-05-30 ENCOUNTER — APPOINTMENT (OUTPATIENT)
Dept: HEART AND VASCULAR | Facility: CLINIC | Age: 53
End: 2023-05-30
Payer: MEDICAID

## 2023-05-30 PROCEDURE — G2066: CPT

## 2023-05-30 PROCEDURE — 93297 REM INTERROG DEV EVAL ICPMS: CPT

## 2023-06-16 NOTE — ED PROVIDER NOTE - PSH
"Preventive Care Visit  [facility]  Patti Bee MD, Pediatrics  Jun 16, 2023  {Provider  Link to [facility] SmartSet :880393}  Assessment & Plan   9 month old, here for preventive care.    {Diagnosis Options:305190}  {Patient advised of split billing (Optional):985410}  Growth      {GROWTH:116994}    Immunizations   {Vaccine counseling is expected when vaccines are given for the first time.   Vaccine counseling would not be expected for subsequent vaccines (after the first of the series) unless there is significant additional documentation:393190}    Anticipatory Guidance    Reviewed age appropriate anticipatory guidance.   {Anticipatory guidance 9m (Optional):862453}    Referrals/Ongoing Specialty Care  {Referrals/Ongoing Specialty Care:636610}  Verbal Dental Referral: {C&TC REQUIRED at eruption of first tooth or 12 mo:956428}  Dental Fluoride Varnish: {Dental Varnish C&TC REQUIRED (AAP Recommended) from tooth eruption through 5 years:630111::\"Yes, fluoride varnish application risks and benefits were discussed, and verbal consent was received.\"}    Subjective     ***      6/16/2023     2:20 PM   Additional Questions   Accompanied by dad   Questions for today's visit No   Surgery, major illness, or injury since last physical No         6/9/2023    10:24 AM   Social   Lives with Parent(s)    Sibling(s)   Who takes care of your child? Parent(s)   Recent potential stressors None   History of trauma No   Family Hx mental health challenges No   Lack of transportation has limited access to appts/meds No   Difficulty paying mortgage/rent on time No   Lack of steady place to sleep/has slept in a shelter No         6/9/2023    10:24 AM   Health Risks/Safety   What type of car seat does your child use?  Infant car seat   Is your child's car seat forward or rear facing? Rear facing   Where does your child sit in the car?  Back seat   Are stairs gated at home? Yes   Do you use space heaters, wood stove, or a " fireplace in your home? No   Are poisons/cleaning supplies and medications kept out of reach? Yes         6/9/2023    10:24 AM   TB Screening   Was your child born outside of the [place]? No         6/9/2023    10:24 AM   TB Screening: Consider immunosuppression as a risk factor for TB   Recent TB infection or positive TB test in family/close contacts No   Recent travel outside [place] (child/family/close contacts) No   Recent residence in high-risk group setting (correctional facility/health care facility/homeless shelter/refugee camp) No          6/9/2023    10:24 AM   Dental Screening   Have parents/caregivers/siblings had cavities in the last 2 years? No         6/9/2023    10:24 AM   Diet   Do you have questions about feeding your baby? No   What does your baby eat? Breast milk    Formula    Water    Baby food/Pureed food   Formula type Enfamil neuropro gentlease   How does your baby eat? Breastfeeding/Nursing    Bottle    Sippy cup    Self-feeding    Spoon feeding by caregiver   Vitamin or supplement use Vitamin D   What type of water? (!) BOTTLED   In past 12 months, concerned food might run out Never true   In past 12 months, food has run out/couldn't afford more Never true         6/9/2023    10:24 AM   Elimination   Bowel or bladder concerns? No concerns         6/9/2023    10:24 AM   Media Use   Hours per day of screen time (for entertainment) 0         6/9/2023    10:24 AM   Sleep   Do you have any concerns about your child's sleep? (!) WAKING AT NIGHT   Where does your baby sleep? Crib   In what position does your baby sleep? Back    (!) SIDE    (!) TUMMY         6/9/2023    10:24 AM   Vision/Hearing   Vision or hearing concerns No concerns         6/9/2023    10:24 AM   Development/ Social-Emotional Screen   Does your child receive any special services? No     Development - ASQ required for C&TC  {Significant changes have been made to the developmental milestones to align with the CDC  "recommendations. Milestones include those that most children (75% or more) are expected to exhibit, so any missing milestone or other concern should prompt additional screening :475836}  Screening tool used, reviewed with parent/guardian:   ASQ 9 M Communication Gross Motor Fine Motor Problem Solving Personal-social   Score 55 60 55 54 50   Cutoff 13.97 17.82 31.32 28.72 18.91   Result Passed Passed Passed Passed Passed     {Milestones C&TC REQUIRED if no screening tool used (Optional):357992::\"Milestones (by observation/ exam/ report) 75-90% ile\",\"SOCIAL/EMOTIONAL:\",\" Is shy, clingy or fearful around strangers\",\" Shows several facial expressions, like happy, sad, angry and surprised\",\" Looks when you call your child's name\",\" Reacts when you leave (looks, reaches for you, or cries)\",\" Smiles or laughs when you play peek-a-gan\",\"LANGUAGE/COMMUNICATION:\",\" Makes a lot of different sounds like \"mamamamamam and bababababa\"\",\" Lifts arms up to be picked up\",\"COGNITIVE (LEARNING, THINKING, PROBLEM-SOLVING):\",\" Looks for objects when dropped out of sight (like a spoon or toy)\",\" Sadler two things together\",\"MOVEMENT/PHYSICAL DEVELOPMENT:\",\" Gets to a sitting position by themself\",\" Moves things from one hand to the other hand\",\" Uses fingers to \"rake\" food towards themself\"}         Objective     Exam  Pulse 140   Temp 97.5  F (36.4  C)   Wt 14 lb 14.5 oz (6.761 kg)   HC 17.4\" (44.2 cm)   SpO2 99%   61 %ile (Z= 0.27) based on WHO (Girls, 0-2 years) head circumference-for-age based on Head Circumference recorded on 6/16/2023.  5 %ile (Z= -1.64) based on WHO (Girls, 0-2 years) weight-for-age data using vitals from 6/16/2023.  No height on file for this encounter.  No height and weight on file for this encounter.    Physical Exam  {FEMALE EXAM 9-12 MO:885028::\"GENERAL: Active, alert,  no  distress.\",\"SKIN: Clear. No significant rash, abnormal pigmentation or lesions.\",\"HEAD: Normocephalic. Normal fontanels and " "sutures.\",\"EYES: Conjunctivae and cornea normal. Red reflexes present bilaterally. Symmetric light reflex and no eye movement on cover/uncover test\",\"EARS: normal: no effusions, no erythema, normal landmarks\",\"NOSE: Normal without discharge.\",\"MOUTH/THROAT: Clear. No oral lesions.\",\"NECK: Supple, no masses.\",\"LYMPH NODES: No adenopathy\",\"LUNGS: Clear. No rales, rhonchi, wheezing or retractions\",\"HEART: Regular rate and rhythm. Normal S1/S2. No murmurs. Normal femoral pulses.\",\"ABDOMEN: Soft, non-tender, not distended, no masses or hepatosplenomegaly. Normal umbilicus and bowel sounds. \",\"GENITALIA: Normal female external genitalia. Abiel stage I,  No inguinal herniae are present.\",\"EXTREMITIES: Hips normal with symmetric creases and full range of motion. Symmetric extremities, no deformities\",\"NEUROLOGIC: Normal tone throughout. Normal reflexes for age\"}    {Immunization Screening- Place Screening for Ped Immunizations order or choose appropriate list to document responses in note (Optional):951268}  Patti Bee MD  [facility]  " No significant past surgical history

## 2023-07-05 ENCOUNTER — APPOINTMENT (OUTPATIENT)
Dept: HEART AND VASCULAR | Facility: CLINIC | Age: 53
End: 2023-07-05
Payer: MEDICAID

## 2023-07-05 ENCOUNTER — NON-APPOINTMENT (OUTPATIENT)
Age: 53
End: 2023-07-05

## 2023-07-05 PROCEDURE — G2066: CPT

## 2023-07-05 PROCEDURE — 93297 REM INTERROG DEV EVAL ICPMS: CPT

## 2023-07-11 NOTE — ED PROVIDER NOTE - CADM POA URETHRAL CATHETER
Bed in lowest position, wheels locked, appropriate side rails in place/Call bell, personal items and telephone in reach/Instruct patient to call for assistance before getting out of bed or chair/Non-slip footwear when patient is out of bed/[place] to call system/Physically safe environment - no spills, clutter or unnecessary equipment/Purposeful Proactive Rounding/Room/bathroom lighting operational, light cord in reach No

## 2023-08-08 ENCOUNTER — APPOINTMENT (OUTPATIENT)
Dept: HEART AND VASCULAR | Facility: CLINIC | Age: 53
End: 2023-08-08
Payer: MEDICAID

## 2023-08-08 ENCOUNTER — NON-APPOINTMENT (OUTPATIENT)
Age: 53
End: 2023-08-08

## 2023-08-08 PROCEDURE — 93295 DEV INTERROG REMOTE 1/2/MLT: CPT

## 2023-08-08 PROCEDURE — 93296 REM INTERROG EVL PM/IDS: CPT

## 2023-09-12 ENCOUNTER — NON-APPOINTMENT (OUTPATIENT)
Age: 53
End: 2023-09-12

## 2023-09-12 ENCOUNTER — APPOINTMENT (OUTPATIENT)
Dept: HEART AND VASCULAR | Facility: CLINIC | Age: 53
End: 2023-09-12
Payer: MEDICAID

## 2023-09-12 PROCEDURE — 93295 DEV INTERROG REMOTE 1/2/MLT: CPT

## 2023-09-12 PROCEDURE — 93296 REM INTERROG EVL PM/IDS: CPT

## 2023-10-17 ENCOUNTER — NON-APPOINTMENT (OUTPATIENT)
Age: 53
End: 2023-10-17

## 2023-10-17 ENCOUNTER — APPOINTMENT (OUTPATIENT)
Dept: HEART AND VASCULAR | Facility: CLINIC | Age: 53
End: 2023-10-17
Payer: MEDICAID

## 2023-10-17 PROCEDURE — 93297 REM INTERROG DEV EVAL ICPMS: CPT | Mod: NC

## 2023-10-17 PROCEDURE — G2066: CPT

## 2023-11-21 ENCOUNTER — APPOINTMENT (OUTPATIENT)
Dept: HEART AND VASCULAR | Facility: CLINIC | Age: 53
End: 2023-11-21
Payer: MEDICAID

## 2023-11-21 ENCOUNTER — NON-APPOINTMENT (OUTPATIENT)
Age: 53
End: 2023-11-21

## 2023-11-21 PROCEDURE — 93296 REM INTERROG EVL PM/IDS: CPT | Mod: NC

## 2023-11-21 PROCEDURE — 93295 DEV INTERROG REMOTE 1/2/MLT: CPT

## 2023-12-26 ENCOUNTER — NON-APPOINTMENT (OUTPATIENT)
Age: 53
End: 2023-12-26

## 2023-12-26 ENCOUNTER — APPOINTMENT (OUTPATIENT)
Dept: HEART AND VASCULAR | Facility: CLINIC | Age: 53
End: 2023-12-26
Payer: MEDICAID

## 2023-12-26 PROCEDURE — 93296 REM INTERROG EVL PM/IDS: CPT | Mod: NC

## 2023-12-26 PROCEDURE — 93295 DEV INTERROG REMOTE 1/2/MLT: CPT

## 2024-01-01 ENCOUNTER — RX RENEWAL (OUTPATIENT)
Age: 54
End: 2024-01-01

## 2024-01-30 ENCOUNTER — APPOINTMENT (OUTPATIENT)
Dept: HEART AND VASCULAR | Facility: CLINIC | Age: 54
End: 2024-01-30
Payer: MEDICAID

## 2024-01-30 ENCOUNTER — NON-APPOINTMENT (OUTPATIENT)
Age: 54
End: 2024-01-30

## 2024-01-30 PROCEDURE — 93297 REM INTERROG DEV EVAL ICPMS: CPT

## 2024-02-05 ENCOUNTER — RX RENEWAL (OUTPATIENT)
Age: 54
End: 2024-02-05

## 2024-02-05 RX ORDER — ATORVASTATIN CALCIUM 80 MG/1
80 TABLET, FILM COATED ORAL DAILY
Qty: 30 | Refills: 0 | Status: ACTIVE | COMMUNITY
Start: 2018-01-03 | End: 1900-01-01

## 2024-02-08 ENCOUNTER — NON-APPOINTMENT (OUTPATIENT)
Age: 54
End: 2024-02-08

## 2024-02-08 ENCOUNTER — APPOINTMENT (OUTPATIENT)
Dept: HEART AND VASCULAR | Facility: CLINIC | Age: 54
End: 2024-02-08
Payer: MEDICAID

## 2024-02-08 VITALS
HEIGHT: 69 IN | OXYGEN SATURATION: 97 % | WEIGHT: 209 LBS | HEART RATE: 95 BPM | TEMPERATURE: 98.5 F | BODY MASS INDEX: 30.96 KG/M2 | SYSTOLIC BLOOD PRESSURE: 119 MMHG | DIASTOLIC BLOOD PRESSURE: 69 MMHG

## 2024-02-08 DIAGNOSIS — I42.8 OTHER CARDIOMYOPATHIES: ICD-10-CM

## 2024-02-08 DIAGNOSIS — E78.5 HYPERLIPIDEMIA, UNSPECIFIED: ICD-10-CM

## 2024-02-08 PROCEDURE — 36415 COLL VENOUS BLD VENIPUNCTURE: CPT

## 2024-02-08 PROCEDURE — 99214 OFFICE O/P EST MOD 30 MIN: CPT | Mod: 25

## 2024-02-08 PROCEDURE — 93000 ELECTROCARDIOGRAM COMPLETE: CPT

## 2024-02-09 ENCOUNTER — RX RENEWAL (OUTPATIENT)
Age: 54
End: 2024-02-09

## 2024-02-09 LAB
ALBUMIN SERPL ELPH-MCNC: 4.4 G/DL
ALP BLD-CCNC: 64 U/L
ALT SERPL-CCNC: 29 U/L
ANION GAP SERPL CALC-SCNC: 12 MMOL/L
AST SERPL-CCNC: 27 U/L
BILIRUB SERPL-MCNC: 0.2 MG/DL
BUN SERPL-MCNC: 12 MG/DL
CALCIUM SERPL-MCNC: 9.8 MG/DL
CHLORIDE SERPL-SCNC: 103 MMOL/L
CHOLEST SERPL-MCNC: 142 MG/DL
CO2 SERPL-SCNC: 26 MMOL/L
CREAT SERPL-MCNC: 0.86 MG/DL
EGFR: 103 ML/MIN/1.73M2
ESTIMATED AVERAGE GLUCOSE: 128 MG/DL
GLUCOSE SERPL-MCNC: 81 MG/DL
HBA1C MFR BLD HPLC: 6.1 %
HDLC SERPL-MCNC: 57 MG/DL
LDLC SERPL CALC-MCNC: 67 MG/DL
NONHDLC SERPL-MCNC: 86 MG/DL
NT-PROBNP SERPL-MCNC: 143 PG/ML
POTASSIUM SERPL-SCNC: 4.8 MMOL/L
PROT SERPL-MCNC: 7 G/DL
SODIUM SERPL-SCNC: 141 MMOL/L
TRIGL SERPL-MCNC: 99 MG/DL

## 2024-02-09 RX ORDER — SPIRONOLACTONE 25 MG/1
25 TABLET ORAL
Qty: 90 | Refills: 10 | Status: ACTIVE | COMMUNITY
Start: 2022-09-09 | End: 1900-01-01

## 2024-02-09 RX ORDER — SACUBITRIL AND VALSARTAN 97; 103 MG/1; MG/1
97-103 TABLET, FILM COATED ORAL
Qty: 180 | Refills: 10 | Status: ACTIVE | COMMUNITY
Start: 2017-03-24 | End: 1900-01-01

## 2024-02-11 NOTE — HISTORY OF PRESENT ILLNESS
[FreeTextEntry1] : 53 M DCM EF 15-20% BivAICD  was out of meds this month. still has entresto but off of other medications. no exertional sxs. no cardiac complaints   ecg sr bivaicd 2/8/24 CCTA 6/2022 CAC score is 0 Echo 6/2022 EF 15-20% LVID 5.3 cm echo 1/23 -EF 45%

## 2024-02-11 NOTE — ASSESSMENT
[FreeTextEntry1] : - Systolic HF check blood work  - continue entresto 97/101 bid restart aldactone 25 mg daily inc toprol xl 50 mg 2 tab in am and one in pm -will focus on compliance, can discuss sglt2 at next visit - does not need such high doses of statin -will check lipids today  - fu in three months for echo and visit

## 2024-03-04 ENCOUNTER — NON-APPOINTMENT (OUTPATIENT)
Age: 54
End: 2024-03-04

## 2024-03-05 ENCOUNTER — APPOINTMENT (OUTPATIENT)
Dept: HEART AND VASCULAR | Facility: CLINIC | Age: 54
End: 2024-03-05
Payer: MEDICAID

## 2024-03-05 PROCEDURE — 93296 REM INTERROG EVL PM/IDS: CPT | Mod: NC

## 2024-03-05 PROCEDURE — 93295 DEV INTERROG REMOTE 1/2/MLT: CPT

## 2024-04-08 ENCOUNTER — NON-APPOINTMENT (OUTPATIENT)
Age: 54
End: 2024-04-08

## 2024-04-09 ENCOUNTER — APPOINTMENT (OUTPATIENT)
Dept: HEART AND VASCULAR | Facility: CLINIC | Age: 54
End: 2024-04-09
Payer: MEDICAID

## 2024-04-09 PROCEDURE — 93297 REM INTERROG DEV EVAL ICPMS: CPT

## 2024-05-13 ENCOUNTER — NON-APPOINTMENT (OUTPATIENT)
Age: 54
End: 2024-05-13

## 2024-05-14 ENCOUNTER — APPOINTMENT (OUTPATIENT)
Dept: HEART AND VASCULAR | Facility: CLINIC | Age: 54
End: 2024-05-14
Payer: MEDICAID

## 2024-05-14 PROCEDURE — 93297 REM INTERROG DEV EVAL ICPMS: CPT

## 2024-05-16 ENCOUNTER — NON-APPOINTMENT (OUTPATIENT)
Age: 54
End: 2024-05-16

## 2024-05-16 ENCOUNTER — APPOINTMENT (OUTPATIENT)
Dept: HEART AND VASCULAR | Facility: CLINIC | Age: 54
End: 2024-05-16
Payer: MEDICAID

## 2024-05-16 VITALS
WEIGHT: 200 LBS | DIASTOLIC BLOOD PRESSURE: 54 MMHG | BODY MASS INDEX: 29.62 KG/M2 | HEART RATE: 81 BPM | HEIGHT: 69 IN | OXYGEN SATURATION: 98 % | SYSTOLIC BLOOD PRESSURE: 110 MMHG | TEMPERATURE: 98 F

## 2024-05-16 DIAGNOSIS — I50.22 CHRONIC SYSTOLIC (CONGESTIVE) HEART FAILURE: ICD-10-CM

## 2024-05-16 PROCEDURE — 93000 ELECTROCARDIOGRAM COMPLETE: CPT

## 2024-05-16 PROCEDURE — 36415 COLL VENOUS BLD VENIPUNCTURE: CPT

## 2024-05-16 PROCEDURE — 99214 OFFICE O/P EST MOD 30 MIN: CPT | Mod: 25

## 2024-05-16 PROCEDURE — 93306 TTE W/DOPPLER COMPLETE: CPT

## 2024-05-16 RX ORDER — METOPROLOL SUCCINATE 200 MG/1
200 TABLET, EXTENDED RELEASE ORAL
Qty: 90 | Refills: 3 | Status: ACTIVE | COMMUNITY
Start: 2019-04-08 | End: 1900-01-01

## 2024-05-17 LAB
ALBUMIN SERPL ELPH-MCNC: 4.4 G/DL
ALP BLD-CCNC: 67 U/L
ALT SERPL-CCNC: 19 U/L
ANION GAP SERPL CALC-SCNC: 12 MMOL/L
AST SERPL-CCNC: 21 U/L
BILIRUB SERPL-MCNC: 0.3 MG/DL
BUN SERPL-MCNC: 13 MG/DL
CALCIUM SERPL-MCNC: 10.1 MG/DL
CHLORIDE SERPL-SCNC: 103 MMOL/L
CHOLEST SERPL-MCNC: 170 MG/DL
CO2 SERPL-SCNC: 26 MMOL/L
CREAT SERPL-MCNC: 0.83 MG/DL
CREAT SPEC-SCNC: 142 MG/DL
EGFR: 104 ML/MIN/1.73M2
ESTIMATED AVERAGE GLUCOSE: 123 MG/DL
GLUCOSE SERPL-MCNC: 88 MG/DL
HBA1C MFR BLD HPLC: 5.9 %
HDLC SERPL-MCNC: 65 MG/DL
LDLC SERPL CALC-MCNC: 71 MG/DL
MICROALBUMIN 24H UR DL<=1MG/L-MCNC: <1.2 MG/DL
MICROALBUMIN/CREAT 24H UR-RTO: NORMAL MG/G
NONHDLC SERPL-MCNC: 105 MG/DL
POTASSIUM SERPL-SCNC: 4.5 MMOL/L
PROT SERPL-MCNC: 6.8 G/DL
SODIUM SERPL-SCNC: 141 MMOL/L
TRIGL SERPL-MCNC: 209 MG/DL

## 2024-05-27 NOTE — ASSESSMENT
[FreeTextEntry1] : - Systolic HF check blood work  - continue entresto 97/101 bid aldactone 25 mg daily inc toprol xl 50 mg 2 tab in am and one in pm - stop aspirin - does not need such high doses of statin - fu in three months will readdress low dose sglt2  - repeat echo

## 2024-05-27 NOTE — HISTORY OF PRESENT ILLNESS
[FreeTextEntry1] : 53 M DCM EF 15-20% BivAICD  here for fu stopped SGLT2 due to dizziness he does not want to restart at a lower dose and is very busy at work no sob defering cardiac rehab for now    ecg sr bivaicd  5/16/24 CCTA 6/2022 CAC score is 0 echo EF 40-45% LVIDD 5.0 cm 5/10/24

## 2024-06-17 ENCOUNTER — NON-APPOINTMENT (OUTPATIENT)
Age: 54
End: 2024-06-17

## 2024-06-18 ENCOUNTER — APPOINTMENT (OUTPATIENT)
Dept: HEART AND VASCULAR | Facility: CLINIC | Age: 54
End: 2024-06-18
Payer: MEDICAID

## 2024-06-18 PROCEDURE — 93296 REM INTERROG EVL PM/IDS: CPT | Mod: NC

## 2024-06-18 PROCEDURE — 93295 DEV INTERROG REMOTE 1/2/MLT: CPT

## 2024-07-23 ENCOUNTER — APPOINTMENT (OUTPATIENT)
Dept: HEART AND VASCULAR | Facility: CLINIC | Age: 54
End: 2024-07-23

## 2024-07-23 PROCEDURE — 93297 REM INTERROG DEV EVAL ICPMS: CPT

## 2024-08-27 ENCOUNTER — APPOINTMENT (OUTPATIENT)
Dept: HEART AND VASCULAR | Facility: CLINIC | Age: 54
End: 2024-08-27

## 2024-08-27 PROCEDURE — 93297 REM INTERROG DEV EVAL ICPMS: CPT

## 2024-09-10 ENCOUNTER — APPOINTMENT (OUTPATIENT)
Dept: HEART AND VASCULAR | Facility: CLINIC | Age: 54
End: 2024-09-10

## 2024-09-11 ENCOUNTER — APPOINTMENT (OUTPATIENT)
Dept: HEART AND VASCULAR | Facility: CLINIC | Age: 54
End: 2024-09-11
Payer: MEDICAID

## 2024-09-11 ENCOUNTER — NON-APPOINTMENT (OUTPATIENT)
Age: 54
End: 2024-09-11

## 2024-09-11 VITALS
DIASTOLIC BLOOD PRESSURE: 59 MMHG | OXYGEN SATURATION: 95 % | WEIGHT: 200 LBS | HEART RATE: 97 BPM | HEIGHT: 69 IN | SYSTOLIC BLOOD PRESSURE: 119 MMHG | BODY MASS INDEX: 29.62 KG/M2 | TEMPERATURE: 97.9 F

## 2024-09-11 DIAGNOSIS — Z95.810 PRESENCE OF AUTOMATIC (IMPLANTABLE) CARDIAC DEFIBRILLATOR: ICD-10-CM

## 2024-09-11 PROCEDURE — 93284 PRGRMG EVAL IMPLANTABLE DFB: CPT

## 2024-09-11 NOTE — PROCEDURE
[No] : not [NSR] : normal sinus rhythm [CRT-D] : Cardiac resynchronization therapy defibrillator [DDD] : DDD [Threshold Testing Performed] : Threshold testing was performed [Lead Imp:  ___ohms] : lead impedance was [unfilled] ohms [Sensing Amplitude ___mv] : sensing amplitude was [unfilled] mv [___V @] : [unfilled] V [___ ms] : [unfilled] ms [None] : none [de-identified] : [facility]  [de-identified] : [employer]  [de-identified] : [id number] [de-identified] : 3/25/19 [de-identified] : 45/130 [de-identified] : 5 years

## 2024-09-11 NOTE — PHYSICAL EXAM
[General Appearance - Well Developed] : well developed [Normal Appearance] : normal appearance [Well Groomed] : well groomed [General Appearance - Well Nourished] : well nourished [No Deformities] : no deformities [General Appearance - In No Acute Distress] : no acute distress [Heart Rate And Rhythm] : heart rate and rhythm were normal [Heart Sounds] : normal S1 and S2 [Edema] : no peripheral edema present [Respiration, Rhythm And Depth] : normal respiratory rhythm and effort [Exaggerated Use Of Accessory Muscles For Inspiration] : no accessory muscle use [Auscultation Breath Sounds / Voice Sounds] : lungs were clear to auscultation bilaterally [Left Infraclavicular] : left infraclavicular area [Clean] : clean [Dry] : dry [Well-Healed] : well-healed [Normal Conjunctiva] : the conjunctiva exhibited no abnormalities [Normal Oropharynx] : normal oropharynx [Abnormal Walk] : normal gait [Gait - Sufficient For Exercise Testing] : the gait was sufficient for exercise testing [Skin Color & Pigmentation] : normal skin color and pigmentation [] : no rash [Oriented To Time, Place, And Person] : oriented to person, place, and time [Impaired Insight] : insight and judgment were intact [Affect] : the affect was normal [Mood] : the mood was normal [No Anxiety] : not feeling anxious [Palpable Crepitus] : no palpable crepitus [Bleeding] : no active bleeding [Foul Odor] : no foul smell [Purulent Drainage] : no purulent drainage [Serous Drainage] : no serous drainage [Erythema] : not erythematous [Warm] : not warm [Tender] : not tender [Indurated] : not indurated [Fluctuant] : not fluctuant

## 2024-09-11 NOTE — HISTORY OF PRESENT ILLNESS
[Palpitations] : no palpitations [SOB] : no dyspnea [Syncope] : no syncope [Dizziness] : no dizziness [Chest Pain] : no chest pain or discomfort [ICD Shocks] : no recent ICD shocks [Shoulder Pain] : no shoulder pain [Pain at Site] : no pain at device site [Erythema at Site] : no erythema at device site [Swelling at Site] : no swelling at device site [FreeTextEntry1] : Mr. Cheema is a 54 year old male with a LBBB and non ischemic cardiomyopathy, on optimal heart failure medications s/p BiV 3/25/19. He presents for routine follow up and overall feels well with no complaints.   He has been noted to have sinus tachycardia on remote transmissions.  Stable exercise tolerance.  He is on Metoprolol.  No palpitations, syncope, near syncope, orthopnea, PND.  No device related issues.  He is pending an echo with Dr. Farris.

## 2024-09-11 NOTE — REASON FOR VISIT
[Follow-up Device Check] : is here today for a follow-up device check visit for [Follow-Up - Clinic] : a clinic follow-up of [Cardiomyopathy] : cardiomyopathy [Other ___] : [unfilled]

## 2024-09-18 ENCOUNTER — APPOINTMENT (OUTPATIENT)
Dept: HEART AND VASCULAR | Facility: CLINIC | Age: 54
End: 2024-09-18
Payer: MEDICAID

## 2024-09-18 ENCOUNTER — NON-APPOINTMENT (OUTPATIENT)
Age: 54
End: 2024-09-18

## 2024-09-18 VITALS
HEART RATE: 70 BPM | DIASTOLIC BLOOD PRESSURE: 70 MMHG | OXYGEN SATURATION: 97 % | HEIGHT: 69 IN | SYSTOLIC BLOOD PRESSURE: 110 MMHG | BODY MASS INDEX: 29.62 KG/M2 | WEIGHT: 200 LBS

## 2024-09-18 DIAGNOSIS — I44.7 LEFT BUNDLE-BRANCH BLOCK, UNSPECIFIED: ICD-10-CM

## 2024-09-18 PROCEDURE — 93000 ELECTROCARDIOGRAM COMPLETE: CPT

## 2024-09-18 PROCEDURE — 36415 COLL VENOUS BLD VENIPUNCTURE: CPT

## 2024-09-18 PROCEDURE — 99214 OFFICE O/P EST MOD 30 MIN: CPT | Mod: 25

## 2024-09-18 NOTE — HISTORY OF PRESENT ILLNESS
[FreeTextEntry1] : 54 M DCM EF 15-20% BivAICD   here for fu stopped SGLT2 due to dizziness he does not want to restart at a lower dose and is very busy at work no sob deferring cardiac rehab for now echo looks like EF has improved   ecg sr bivaicd  9/18/24 CCTA 6/2022 CAC score is 0 echo EF 40-45% LVIDD 5.0 cm 5/10/24

## 2024-09-18 NOTE — ASSESSMENT
[FreeTextEntry1] : - Systolic HF check blood work  - continue entresto 97/101 bid aldactone 25 mg daily on toprol xl 200 mg and aldactone 25 mg daily  - does not need such high doses of statin - fu in three months will readdress low dose sglt2  - fu in four months

## 2024-09-19 LAB
ALBUMIN SERPL ELPH-MCNC: 4.5 G/DL
ALP BLD-CCNC: 69 U/L
ALT SERPL-CCNC: 20 U/L
ANION GAP SERPL CALC-SCNC: 12 MMOL/L
AST SERPL-CCNC: 26 U/L
BILIRUB SERPL-MCNC: 0.3 MG/DL
BUN SERPL-MCNC: 12 MG/DL
CALCIUM SERPL-MCNC: 9.8 MG/DL
CHLORIDE SERPL-SCNC: 105 MMOL/L
CHOLEST SERPL-MCNC: 124 MG/DL
CO2 SERPL-SCNC: 26 MMOL/L
CREAT SERPL-MCNC: 0.77 MG/DL
CREAT SPEC-SCNC: 200 MG/DL
EGFR: 106 ML/MIN/1.73M2
ESTIMATED AVERAGE GLUCOSE: 123 MG/DL
GLUCOSE SERPL-MCNC: 106 MG/DL
HBA1C MFR BLD HPLC: 5.9 %
HDLC SERPL-MCNC: 53 MG/DL
LDLC SERPL CALC-MCNC: 49 MG/DL
MICROALBUMIN 24H UR DL<=1MG/L-MCNC: <1.2 MG/DL
MICROALBUMIN/CREAT 24H UR-RTO: NORMAL MG/G
NONHDLC SERPL-MCNC: 71 MG/DL
NT-PROBNP SERPL-MCNC: 71 PG/ML
POTASSIUM SERPL-SCNC: 4.1 MMOL/L
PROT SERPL-MCNC: 6.8 G/DL
SODIUM SERPL-SCNC: 143 MMOL/L
TRIGL SERPL-MCNC: 124 MG/DL

## 2024-10-17 ENCOUNTER — APPOINTMENT (OUTPATIENT)
Dept: HEART AND VASCULAR | Facility: CLINIC | Age: 54
End: 2024-10-17

## 2024-10-17 PROCEDURE — 93297 REM INTERROG DEV EVAL ICPMS: CPT

## 2024-11-21 ENCOUNTER — APPOINTMENT (OUTPATIENT)
Dept: HEART AND VASCULAR | Facility: CLINIC | Age: 54
End: 2024-11-21
Payer: MEDICAID

## 2024-11-21 ENCOUNTER — NON-APPOINTMENT (OUTPATIENT)
Age: 54
End: 2024-11-21

## 2024-11-21 PROCEDURE — 93298 REM INTERROG DEV EVAL SCRMS: CPT

## 2024-12-26 ENCOUNTER — NON-APPOINTMENT (OUTPATIENT)
Age: 54
End: 2024-12-26

## 2024-12-26 ENCOUNTER — APPOINTMENT (OUTPATIENT)
Dept: HEART AND VASCULAR | Facility: CLINIC | Age: 54
End: 2024-12-26

## 2024-12-26 PROCEDURE — 93296 REM INTERROG EVL PM/IDS: CPT | Mod: NC

## 2024-12-26 PROCEDURE — 93295 DEV INTERROG REMOTE 1/2/MLT: CPT

## 2025-01-30 ENCOUNTER — APPOINTMENT (OUTPATIENT)
Dept: HEART AND VASCULAR | Facility: CLINIC | Age: 55
End: 2025-01-30
Payer: MEDICAID

## 2025-01-30 ENCOUNTER — NON-APPOINTMENT (OUTPATIENT)
Age: 55
End: 2025-01-30

## 2025-01-30 PROCEDURE — 93297 REM INTERROG DEV EVAL ICPMS: CPT

## 2025-02-20 ENCOUNTER — NON-APPOINTMENT (OUTPATIENT)
Age: 55
End: 2025-02-20

## 2025-02-20 ENCOUNTER — APPOINTMENT (OUTPATIENT)
Dept: HEART AND VASCULAR | Facility: CLINIC | Age: 55
End: 2025-02-20
Payer: MEDICAID

## 2025-02-20 VITALS
OXYGEN SATURATION: 96 % | SYSTOLIC BLOOD PRESSURE: 112 MMHG | BODY MASS INDEX: 31.55 KG/M2 | TEMPERATURE: 97.8 F | WEIGHT: 213 LBS | DIASTOLIC BLOOD PRESSURE: 68 MMHG | HEART RATE: 50 BPM | HEIGHT: 69 IN

## 2025-02-20 DIAGNOSIS — E78.5 HYPERLIPIDEMIA, UNSPECIFIED: ICD-10-CM

## 2025-02-20 DIAGNOSIS — I42.8 OTHER CARDIOMYOPATHIES: ICD-10-CM

## 2025-02-20 PROCEDURE — 99214 OFFICE O/P EST MOD 30 MIN: CPT

## 2025-02-20 PROCEDURE — 93000 ELECTROCARDIOGRAM COMPLETE: CPT

## 2025-02-20 PROCEDURE — 36415 COLL VENOUS BLD VENIPUNCTURE: CPT

## 2025-02-21 LAB
ALBUMIN SERPL ELPH-MCNC: 4.1 G/DL
ALP BLD-CCNC: 64 U/L
ALT SERPL-CCNC: 24 U/L
ANION GAP SERPL CALC-SCNC: 11 MMOL/L
AST SERPL-CCNC: 18 U/L
BASOPHILS # BLD AUTO: 0.03 K/UL
BASOPHILS NFR BLD AUTO: 0.4 %
BILIRUB SERPL-MCNC: 0.3 MG/DL
BUN SERPL-MCNC: 15 MG/DL
CALCIUM SERPL-MCNC: 9.6 MG/DL
CHLORIDE SERPL-SCNC: 105 MMOL/L
CHOLEST SERPL-MCNC: 128 MG/DL
CO2 SERPL-SCNC: 25 MMOL/L
CREAT SERPL-MCNC: 0.74 MG/DL
CREAT SPEC-SCNC: 172 MG/DL
EGFR: 107 ML/MIN/1.73M2
EOSINOPHIL # BLD AUTO: 0.1 K/UL
EOSINOPHIL NFR BLD AUTO: 1.3 %
ESTIMATED AVERAGE GLUCOSE: 131 MG/DL
GLUCOSE SERPL-MCNC: 117 MG/DL
HBA1C MFR BLD HPLC: 6.2 %
HCT VFR BLD CALC: 40.8 %
HDLC SERPL-MCNC: 48 MG/DL
HGB BLD-MCNC: 13.3 G/DL
IMM GRANULOCYTES NFR BLD AUTO: 0.3 %
LDLC SERPL CALC-MCNC: 61 MG/DL
LYMPHOCYTES # BLD AUTO: 2.78 K/UL
LYMPHOCYTES NFR BLD AUTO: 35 %
MAN DIFF?: NORMAL
MCHC RBC-ENTMCNC: 30.2 PG
MCHC RBC-ENTMCNC: 32.6 G/DL
MCV RBC AUTO: 92.5 FL
MICROALBUMIN 24H UR DL<=1MG/L-MCNC: <1.2 MG/DL
MICROALBUMIN/CREAT 24H UR-RTO: NORMAL MG/G
MONOCYTES # BLD AUTO: 0.56 K/UL
MONOCYTES NFR BLD AUTO: 7 %
NEUTROPHILS # BLD AUTO: 4.46 K/UL
NEUTROPHILS NFR BLD AUTO: 56 %
NONHDLC SERPL-MCNC: 79 MG/DL
PLATELET # BLD AUTO: 383 K/UL
POTASSIUM SERPL-SCNC: 4 MMOL/L
PROT SERPL-MCNC: 6.8 G/DL
RBC # BLD: 4.41 M/UL
RBC # FLD: 13.2 %
SODIUM SERPL-SCNC: 140 MMOL/L
TRIGL SERPL-MCNC: 100 MG/DL
WBC # FLD AUTO: 7.95 K/UL

## 2025-03-05 ENCOUNTER — APPOINTMENT (OUTPATIENT)
Dept: HEART AND VASCULAR | Facility: CLINIC | Age: 55
End: 2025-03-05
Payer: MEDICAID

## 2025-03-05 ENCOUNTER — NON-APPOINTMENT (OUTPATIENT)
Age: 55
End: 2025-03-05

## 2025-03-05 PROCEDURE — 93297 REM INTERROG DEV EVAL ICPMS: CPT

## 2025-04-09 ENCOUNTER — NON-APPOINTMENT (OUTPATIENT)
Age: 55
End: 2025-04-09

## 2025-04-09 ENCOUNTER — APPOINTMENT (OUTPATIENT)
Dept: HEART AND VASCULAR | Facility: CLINIC | Age: 55
End: 2025-04-09
Payer: MEDICAID

## 2025-04-09 PROCEDURE — 93296 REM INTERROG EVL PM/IDS: CPT | Mod: NC

## 2025-04-09 PROCEDURE — 93295 DEV INTERROG REMOTE 1/2/MLT: CPT

## 2025-04-21 NOTE — PATIENT PROFILE ADULT - NSASFUNCLEVELADLTOILET_GEN_A_NUR
FAMILY HISTORY:  Father  Still living? No  Family history of heart attack, Age at diagnosis: Age Unknown    
0 = independent

## 2025-05-12 ENCOUNTER — NON-APPOINTMENT (OUTPATIENT)
Age: 55
End: 2025-05-12

## 2025-05-12 ENCOUNTER — APPOINTMENT (OUTPATIENT)
Dept: HEART AND VASCULAR | Facility: CLINIC | Age: 55
End: 2025-05-12
Payer: MEDICAID

## 2025-05-12 PROCEDURE — 93297 REM INTERROG DEV EVAL ICPMS: CPT

## 2025-05-20 ENCOUNTER — EMERGENCY (EMERGENCY)
Facility: HOSPITAL | Age: 55
LOS: 1 days | End: 2025-05-20
Attending: EMERGENCY MEDICINE
Payer: MEDICAID

## 2025-05-20 VITALS
RESPIRATION RATE: 19 BRPM | DIASTOLIC BLOOD PRESSURE: 67 MMHG | TEMPERATURE: 98 F | HEIGHT: 70 IN | HEART RATE: 67 BPM | OXYGEN SATURATION: 97 % | SYSTOLIC BLOOD PRESSURE: 108 MMHG | WEIGHT: 199.96 LBS

## 2025-05-20 PROCEDURE — 99283 EMERGENCY DEPT VISIT LOW MDM: CPT

## 2025-05-20 PROCEDURE — 99283 EMERGENCY DEPT VISIT LOW MDM: CPT | Mod: 25

## 2025-05-20 PROCEDURE — 69209 REMOVE IMPACTED EAR WAX UNI: CPT

## 2025-05-20 RX ORDER — OFLOXACIN 0.3 %
10 DROPS OTIC (EAR)
Qty: 1 | Refills: 0
Start: 2025-05-20 | End: 2025-05-26

## 2025-05-20 NOTE — ED ADULT NURSE NOTE - NS ED PATIENT SAFETY CONCERN
PT initial evaluation received and chart review completed. Pt agreeable to participate in PT evaluation. ACTIVITY: AMBULATE WITH ASSISTANCE/yes No

## 2025-05-20 NOTE — ED PROVIDER NOTE - NSFOLLOWUPINSTRUCTIONS_ED_ALL_ED_FT
Follow up with an ENT as soon as possible.     Antibiotic ear drops and drops to break up the ear wax were sent to the pharmacy. use as directed.     You can take Motrin (ibuprofen) 600 mg every 6 hours or Tylenol (acetaminophen) 1000 mg every 6 hours as needed for pain or fever.     If you experience any new or worsening symptoms or if you are concerned you can always come back to the emergency for a re-evaluation.

## 2025-05-20 NOTE — ED PROVIDER NOTE - PROGRESS NOTE DETAILS
Unsuccessful attempt in removing all of the cerumen in the ER as patient could not tolerate.  Small bleeding noted in the ear canal postprocedure.  Will send antibiotic eardrops to prevent an infection and have patient follow-up with ENT. Pt is well appearing walking with steady gait, stable for discharge and follow up without fail with medical doctor. I had a detailed discussion with the patient and/or guardian regarding the historical points, exam findings, and any diagnostic results supporting the discharge diagnosis. Pt educated on care and need for follow up. Strict return instructions and red flag signs and symptoms discussed with patient. Questions answered. Pt shows understanding of discharge information and agrees to follow.

## 2025-05-20 NOTE — ED ADULT TRIAGE NOTE - CHIEF COMPLAINT QUOTE
as per pt today I feel something in my right ear felt like clogged so I used a q tip to clean I thing I aggravated it ,I have ringing in my ear all day

## 2025-05-20 NOTE — ED PROVIDER NOTE - PATIENT PORTAL LINK FT
You can access the FollowMyHealth Patient Portal offered by [facility] by registering at the following website: [email or web address]. By joining [employer]’s FollowMyHealth portal, you will also be able to view your health information using other applications (apps) compatible with our system.

## 2025-05-20 NOTE — ED PROVIDER NOTE - OBJECTIVE STATEMENT
54-year-old male with a past medical history of congestive heart failure, HLD, hypertension, kidney stones presenting with right ear cloggged sensation and ringing that began this morning.  Patient reports his symptoms worsened after he attempted to clean it with a Q-tip.

## 2025-05-20 NOTE — ED PROVIDER NOTE - CLINICAL SUMMARY MEDICAL DECISION MAKING FREE TEXT BOX
54-year-old male with a past medical history of congestive heart failure, HLD, hypertension, kidney stones presenting with right ear clogged sensation and ringing that began this morning.  Patient reports his symptoms worsened after he attempted to clean it with a Q-tip.    Right ear with cerumen impaction. Will remove. 54-year-old male with a past medical history of congestive heart failure, HLD, hypertension, kidney stones presenting with right ear clogged sensation and ringing that began this morning.  Patient reports his symptoms worsened after he attempted to clean it with a Q-tip.    Right ear with cerumen impaction. Shared decision making performed with patient regarding procedure in the ED vs follow up with ENT.  Will attempt to remove.

## 2025-05-23 ENCOUNTER — APPOINTMENT (OUTPATIENT)
Dept: OTOLARYNGOLOGY | Facility: CLINIC | Age: 55
End: 2025-05-23

## 2025-06-16 ENCOUNTER — APPOINTMENT (OUTPATIENT)
Dept: HEART AND VASCULAR | Facility: CLINIC | Age: 55
End: 2025-06-16
Payer: MEDICAID

## 2025-06-16 ENCOUNTER — NON-APPOINTMENT (OUTPATIENT)
Age: 55
End: 2025-06-16

## 2025-06-16 PROCEDURE — 93297 REM INTERROG DEV EVAL ICPMS: CPT

## 2025-07-21 ENCOUNTER — NON-APPOINTMENT (OUTPATIENT)
Age: 55
End: 2025-07-21

## 2025-07-21 ENCOUNTER — APPOINTMENT (OUTPATIENT)
Dept: HEART AND VASCULAR | Facility: CLINIC | Age: 55
End: 2025-07-21
Payer: MEDICAID

## 2025-07-21 PROCEDURE — 93295 DEV INTERROG REMOTE 1/2/MLT: CPT

## 2025-07-21 PROCEDURE — 93296 REM INTERROG EVL PM/IDS: CPT | Mod: NC

## 2025-08-25 ENCOUNTER — APPOINTMENT (OUTPATIENT)
Dept: HEART AND VASCULAR | Facility: CLINIC | Age: 55
End: 2025-08-25
Payer: MEDICAID

## 2025-08-25 ENCOUNTER — NON-APPOINTMENT (OUTPATIENT)
Age: 55
End: 2025-08-25

## 2025-08-25 PROCEDURE — 93297 REM INTERROG DEV EVAL ICPMS: CPT
